# Patient Record
Sex: MALE | Race: WHITE | Employment: FULL TIME | URBAN - METROPOLITAN AREA
[De-identification: names, ages, dates, MRNs, and addresses within clinical notes are randomized per-mention and may not be internally consistent; named-entity substitution may affect disease eponyms.]

---

## 2022-06-25 ENCOUNTER — HOSPITAL ENCOUNTER (EMERGENCY)
Age: 47
Discharge: HOME | End: 2022-06-26
Attending: EMERGENCY MEDICINE
Payer: COMMERCIAL

## 2022-06-25 DIAGNOSIS — M67.972 DISORDER OF LEFT ACHILLES TENDON: Primary | ICD-10-CM

## 2022-06-25 PROCEDURE — 29515 APPLICATION SHORT LEG SPLINT: CPT

## 2022-06-25 PROCEDURE — 99284 EMERGENCY DEPT VISIT MOD MDM: CPT

## 2022-06-25 PROCEDURE — 96372 THER/PROPH/DIAG INJ SC/IM: CPT

## 2022-06-26 VITALS
RESPIRATION RATE: 16 BRPM | DIASTOLIC BLOOD PRESSURE: 90 MMHG | BODY MASS INDEX: 29.35 KG/M2 | WEIGHT: 205 LBS | TEMPERATURE: 98 F | OXYGEN SATURATION: 97 % | SYSTOLIC BLOOD PRESSURE: 143 MMHG | HEART RATE: 54 BPM | HEIGHT: 70 IN

## 2022-06-26 PROCEDURE — 6360000002 HC RX W HCPCS: Performed by: EMERGENCY MEDICINE

## 2022-06-26 RX ORDER — OMEPRAZOLE 40 MG/1
40 CAPSULE, DELAYED RELEASE ORAL DAILY
COMMUNITY
Start: 2021-11-04

## 2022-06-26 RX ORDER — FEXOFENADINE HCL 180 MG/1
180 TABLET ORAL DAILY PRN
COMMUNITY

## 2022-06-26 RX ORDER — AMLODIPINE BESYLATE 5 MG/1
5 TABLET ORAL DAILY
COMMUNITY
Start: 2022-04-27

## 2022-06-26 RX ORDER — BISOPROLOL FUMARATE AND HYDROCHLOROTHIAZIDE 5; 6.25 MG/1; MG/1
TABLET ORAL
COMMUNITY
Start: 2021-11-04

## 2022-06-26 RX ORDER — ZOLPIDEM TARTRATE 10 MG/1
10 TABLET ORAL NIGHTLY PRN
COMMUNITY
Start: 2021-11-04

## 2022-06-26 RX ORDER — KETOROLAC TROMETHAMINE 30 MG/ML
30 INJECTION, SOLUTION INTRAMUSCULAR; INTRAVENOUS ONCE
Status: COMPLETED | OUTPATIENT
Start: 2022-06-26 | End: 2022-06-26

## 2022-06-26 RX ORDER — HYDROCODONE BITARTRATE AND ACETAMINOPHEN 5; 325 MG/1; MG/1
1 TABLET ORAL EVERY 8 HOURS PRN
Qty: 6 TABLET | Refills: 0 | Status: SHIPPED | OUTPATIENT
Start: 2022-06-26 | End: 2022-06-29

## 2022-06-26 RX ORDER — DEXTROAMPHETAMINE SACCHARATE, AMPHETAMINE ASPARTATE MONOHYDRATE, DEXTROAMPHETAMINE SULFATE AND AMPHETAMINE SULFATE 2.5; 2.5; 2.5; 2.5 MG/1; MG/1; MG/1; MG/1
CAPSULE, EXTENDED RELEASE ORAL
COMMUNITY
Start: 2022-05-05

## 2022-06-26 RX ADMIN — KETOROLAC TROMETHAMINE 30 MG: 30 INJECTION, SOLUTION INTRAMUSCULAR; INTRAVENOUS at 00:42

## 2022-06-26 ASSESSMENT — ENCOUNTER SYMPTOMS
WHEEZING: 0
SINUS PRESSURE: 0
DIARRHEA: 0
SORE THROAT: 0
NAUSEA: 0
VOMITING: 0
ABDOMINAL PAIN: 0
RHINORRHEA: 0
SHORTNESS OF BREATH: 0
COUGH: 0
CONSTIPATION: 0

## 2022-06-26 ASSESSMENT — PAIN - FUNCTIONAL ASSESSMENT: PAIN_FUNCTIONAL_ASSESSMENT: 0-10

## 2022-06-26 ASSESSMENT — PAIN DESCRIPTION - PAIN TYPE: TYPE: ACUTE PAIN

## 2022-06-26 ASSESSMENT — PAIN DESCRIPTION - LOCATION: LOCATION: LEG

## 2022-06-26 ASSESSMENT — PAIN SCALES - GENERAL: PAINLEVEL_OUTOF10: 8

## 2022-06-26 ASSESSMENT — PAIN DESCRIPTION - ORIENTATION: ORIENTATION: LEFT

## 2022-06-26 NOTE — ED PROVIDER NOTES
Emergency Department Encounter    Patient: Jesusita Corrigan  MRN: [id number]  : 1975  Date of Evaluation: 2022  ED Provider:  [id number] [facility]     Triage Chief Complaint:   Leg Pain    HPI:  Jesusita Corrigan is a 50 y.o. male with past medical history significant for hypertension who presents with pain to his left lower extremity. Patient was playing basketball this evening, when he went to push off of his left leg he felt like he was hit in his calf with either a baseball bat or a baseball, and felt something pop in his ankle. He has been unable to weight-bear, and has had difficulty moving his foot since this incident. He denies head trauma, LOC, neck or back pain. This incident happened just prior to arrival.  Patient has had constant, throbbing, 8 out of 10 pain. Denies F/C, CP, SOB, palpitations, N/V, abdominal pain, dysuria, diarrhea and constipatin. ROS:  Review of Systems   Constitutional: Negative for chills and fever. HENT: Negative for congestion, rhinorrhea, sinus pressure and sore throat. Eyes: Negative for visual disturbance. Respiratory: Negative for cough, shortness of breath and wheezing. Cardiovascular: Negative for chest pain and palpitations. Gastrointestinal: Negative for abdominal pain, constipation, diarrhea, nausea and vomiting. Genitourinary: Negative for dysuria, frequency and urgency. Musculoskeletal: Positive for arthralgias and myalgias. Skin: Negative for rash. Neurological: Negative for dizziness and syncope. Psychiatric/Behavioral: Negative for agitation, behavioral problems and confusion. Past Medical History:   Diagnosis Date    Hypertension      History reviewed. No pertinent surgical history. History reviewed. No pertinent family history.   Social History     Socioeconomic History    Marital status:      Spouse name: Not on file    Number of children: Not on file    Years of education: Not on file    Highest education level: Not on file   Occupational History    Not on file   Tobacco Use    Smoking status: Never Smoker    Smokeless tobacco: Current User     Types: Chew   Vaping Use    Vaping Use: Never used   Substance and Sexual Activity    Alcohol use: Yes    Drug use: Never    Sexual activity: Not on file   Other Topics Concern    Not on file   Social History Narrative    Not on file     Social Determinants of Health     Financial Resource Strain:     Difficulty of Paying Living Expenses: Not on file   Food Insecurity:     Worried About Running Out of Food in the Last Year: Not on file    Francis of Food in the Last Year: Not on file   Transportation Needs:     Lack of Transportation (Medical): Not on file    Lack of Transportation (Non-Medical): Not on file   Physical Activity:     Days of Exercise per Week: Not on file    Minutes of Exercise per Session: Not on file   Stress:     Feeling of Stress : Not on file   Social Connections:     Frequency of Communication with Friends and Family: Not on file    Frequency of Social Gatherings with Friends and Family: Not on file    Attends Anabaptist Services: Not on file    Active Member of [address] [place] [address] or Organizations: Not on file    Attends Club or Organization Meetings: Not on file    Marital Status: Not on file   Intimate Partner Violence:     Fear of Current or Ex-Partner: Not on file    Emotionally Abused: Not on file    Physically Abused: Not on file    Sexually Abused: Not on file   Housing Stability:     Unable to Pay for Housing in the Last Year: Not on file    Number of [place] in the Last Year: Not on file    Unstable Housing in the Last Year: Not on file     No current facility-administered medications for this encounter.      Current Outpatient Medications   Medication Sig Dispense Refill    omeprazole (PRILOSEC) 40 MG delayed release capsule Take 40 mg by mouth      amLODIPine (NORVASC) 5 MG tablet Take 5 mg by mouth      bisoprolol-hydroCHLOROthiazide (ZIAC) 5-6.25 MG per tablet TAKE 1 TABLET DAILY FOR BLOOD PRESSURE      zolpidem (AMBIEN) 10 MG tablet Take 10 mg by mouth nightly as needed.  fexofenadine (ALLEGRA ALLERGY) 180 MG tablet Take 180 mg by mouth daily      amphetamine-dextroamphetamine (ADDERALL XR) 10 MG extended release capsule TAKE 1 CAPSULE (10 MG TOTAL) BY MOUTH 2 TIMES A DAY. No Known Allergies    Nursing Notes Reviewed    Physical Exam:  Triage VS:    ED Triage Vitals [06/26/22 0010]   Enc Vitals Group      BP (!) 143/90      Heart Rate 54      Resp 16      Temp 98 °F (36.7 °C)      Temp Source Oral      SpO2 97 %      Weight 205 lb (93 kg)      Height 5' 10\" (1.778 m)      Head Circumference       Peak Flow       Pain Score       Pain Loc       Pain Edu? Excl. in [address]? Physical Exam  Vitals and nursing note reviewed. Constitutional:       Appearance: Normal appearance. HENT:      Head: Normocephalic and atraumatic. Nose: Nose normal.      Mouth/Throat:      Mouth: Mucous membranes are moist.   Eyes:      Conjunctiva/sclera: Conjunctivae normal.   Cardiovascular:      Rate and Rhythm: Normal rate and regular rhythm. Pulses: Normal pulses. Pulmonary:      Effort: Pulmonary effort is normal. No respiratory distress. Breath sounds: Normal breath sounds. No wheezing, rhonchi or rales. Abdominal:      General: Abdomen is flat. Bowel sounds are normal. There is no distension. Palpations: Abdomen is soft. Tenderness: There is no abdominal tenderness. There is no guarding or rebound. Musculoskeletal:      Comments: Left calf:  DP, PT pulses and distal capillary refill intact. Patient is able to dorsiflex his foot without difficulty, however with plantar flexion, he has weak plantar flexion, approximately 2 out of 5. There is mild edema around the Achilles tendon, and the Achilles tendon is tender to palpation, and diminished in strength.   Positive Valencia test. Sensation intact throughout the left lower extremity. Compartments are soft. Skin:     General: Skin is warm. Capillary Refill: Capillary refill takes less than 2 seconds. Neurological:      Mental Status: He is alert and oriented to person, place, and time. Mental status is at baseline. Psychiatric:         Mood and Affect: Mood normal.         Chart review shows recent radiographs:  No results found. MDM:  Patient seen and examined. Patient's physical exam is consistent with an Achilles tendon tear or rupture. He is neurovascular intact, with soft compartments. He is placed in a short leg splint, please see procedure note for further documentation. Patient did tolerate this procedure well. He did bring crutches from home to use, and he does wish to continue using his crutches. I did discuss case with orthopedic surgery on-call. Patient to follow-up with orthopedic surgery on-call on Monday, 6/27/2022. Patient agrees to do this. Return precautions are discussed, patient does verbalize understanding of these. All questions are answered and he is in agreement with plan of care. 12:36 AM  Case discussed with orthopedic surgery on-call, Dr. Brigido Goldberg, who will see patient on outpatient basis, patient to call the office on Monday. 3:38 AM  Patient remains neurovascularly intact, DP, PT pulses intact and compartments are soft. Splint Application    Date/Time: 6/26/2022 3:41 AM  Performed by: China Moseley DO  Authorized by: China Moseley DO     Consent:     Consent obtained:  Verbal    Consent given by:  Patient  Procedure details:     Laterality:  Left    Location:  Leg    Leg:  L lower leg    Splint type:  Short leg    Supplies:  Ortho-Glass  Post-procedure details:     Pain:  Improved    Sensation:  Normal    Patient tolerance of procedure: Tolerated well, no immediate complications                Clinical Impression:  1.  Disorder of left Achilles tendon Disposition referral (if applicable):  Caren Munguia DO  [address]  [phone]    Schedule an appointment as soon as possible for a visit in 2 days      [place] [address] [address]  [phone]  Go to   As needed, If symptoms worsen    Disposition medications (if applicable):  New Prescriptions    HYDROCODONE-ACETAMINOPHEN (NORCO) 5-325 MG PER TABLET    Take 1 tablet by mouth every 8 hours as needed for Pain for up to 3 days. Intended supply: 3 days. Take lowest dose possible to manage pain       Comment: Please note this report has been produced using speech recognition software and may contain errors related to that system including errors in grammar, punctuation, and spelling, as well as words and phrases that may be inappropriate. Efforts were made to edit the dictations.        [id number] [facility],   06/26/22 4566

## 2022-06-26 NOTE — ED TRIAGE NOTES
approx 2000, Playing basketball, felt and heard pop in left calf. Unable to bear weight and reports episodes of numbness.

## 2022-06-27 ENCOUNTER — OFFICE VISIT (OUTPATIENT)
Dept: ORTHOPEDIC SURGERY | Age: 47
End: 2022-06-27
Payer: COMMERCIAL

## 2022-06-27 VITALS
BODY MASS INDEX: 29.41 KG/M2 | RESPIRATION RATE: 16 BRPM | HEIGHT: 70 IN | DIASTOLIC BLOOD PRESSURE: 72 MMHG | HEART RATE: 44 BPM | OXYGEN SATURATION: 98 % | SYSTOLIC BLOOD PRESSURE: 122 MMHG

## 2022-06-27 DIAGNOSIS — S86.012A STRAIN OF LEFT ACHILLES TENDON, INITIAL ENCOUNTER: Primary | ICD-10-CM

## 2022-06-27 DIAGNOSIS — S86.012A ACHILLES RUPTURE, LEFT, INITIAL ENCOUNTER: ICD-10-CM

## 2022-06-27 PROCEDURE — 99203 OFFICE O/P NEW LOW 30 MIN: CPT | Performed by: ORTHOPAEDIC SURGERY

## 2022-06-27 RX ORDER — SILDENAFIL 100 MG/1
100 TABLET, FILM COATED ORAL PRN
COMMUNITY
Start: 2021-11-04 | End: 2022-11-04

## 2022-06-27 RX ORDER — ASPIRIN 81 MG/1
81 TABLET ORAL DAILY
COMMUNITY

## 2022-06-27 NOTE — PATIENT INSTRUCTIONS
We will schedule surgery at soonest convenience.  If you have any questions regarding your surgery please call our office and ask to speak with [facility] [phone]

## 2022-06-27 NOTE — PROGRESS NOTES
Patient is in the office with a left achilles tendon rupture. Patient states that he was playing basketball and he stepped back and felt a weird pain within the achilles like he was hit with a baseball bat.

## 2022-06-27 NOTE — PROGRESS NOTES
 Frequency of Social Gatherings with Friends and Family: Not on file    Attends Scientologist Services: Not on file    Active Member of Clubs or Organizations: Not on file    Attends Club or Organization Meetings: Not on file    Marital Status: Not on file   Intimate Partner Violence:     Fear of Current or Ex-Partner: Not on file    Emotionally Abused: Not on file    Physically Abused: Not on file    Sexually Abused: Not on file   Housing Stability:     Unable to Pay for Housing in the Last Year: Not on file    Number of [place] in the Last Year: Not on file    Unstable Housing in the Last Year: Not on file       Current Outpatient Medications   Medication Sig Dispense Refill    sildenafil (VIAGRA) 100 MG tablet Take 100 mg by mouth as needed      aspirin 81 MG EC tablet Take 81 mg by mouth daily      omeprazole (PRILOSEC) 40 MG delayed release capsule Take 40 mg by mouth      amLODIPine (NORVASC) 5 MG tablet Take 5 mg by mouth      bisoprolol-hydroCHLOROthiazide (ZIAC) 5-6.25 MG per tablet TAKE 1 TABLET DAILY FOR BLOOD PRESSURE      amphetamine-dextroamphetamine (ADDERALL XR) 10 MG extended release capsule TAKE 1 CAPSULE (10 MG TOTAL) BY MOUTH 2 TIMES A DAY.  zolpidem (AMBIEN) 10 MG tablet Take 10 mg by mouth nightly as needed.  fexofenadine (ALLEGRA ALLERGY) 180 MG tablet Take 180 mg by mouth daily      HYDROcodone-acetaminophen (NORCO) 5-325 MG per tablet Take 1 tablet by mouth every 8 hours as needed for Pain for up to 3 days. Intended supply: 3 days. Take lowest dose possible to manage pain 6 tablet 0     No current facility-administered medications for this visit. No Known Allergies    Review of Systems:    Review of Systems   Musculoskeletal: Positive for arthralgias and myalgias. Skin: Negative for rash and wound. Physical Exam:   Resp 16   Ht 5' 10\" (1.778 m)   BMI 29.41 kg/m²       Patient is on crutches and is not bearing weight on the left ankle. Gen/Psych:Examination reveals a pleasant individual in no acute distress. The patient is oriented to time, place and person. The patient's mood and affect are appropriate. Patient appears well nourished. Body habitus is mildly overweight    HEENT: Head is atraumatic normocephalic,  ears are symmetric, eyes show equal pupils bilaterally, extraocular muscles intact. Hearing is intact to normal voice of 5 feet. Lymph:  No lymphedema in bilateral lower extremities     Skin: intact in bilateral lower extremities with no ulcerations, lesions, rash, erythema. Vascular: There are no varicosities in bilateral lower extremities, sensation intact to light touch over bilateral lower extremities. Musculoskeletal:     left ankle/foot exam:  Inspection: Ecchymosis present  Palpation:Palpable defect within the distal distal third of the tendon  Range of motion/Strength   Dorsiflexion 5 degrees       [place] test: Achilles tendon not intact        Outside record review:   ER note reviewed        Impression:     Diagnosis Orders   1.  Strain of left Achilles tendon, initial encounter  XR ANKLE LEFT (MIN 3 VIEWS)   2. Achilles rupture, left, initial encounter             Plan:    Patient will be switched to Dr. Parrish Night schedule as this appears to be surgical

## 2022-06-28 ENCOUNTER — TELEPHONE (OUTPATIENT)
Dept: ORTHOPEDIC SURGERY | Age: 47
End: 2022-06-28

## 2022-06-28 ASSESSMENT — ENCOUNTER SYMPTOMS
CHEST TIGHTNESS: 0
BACK PAIN: 0
ABDOMINAL PAIN: 0
COLOR CHANGE: 0
EYE REDNESS: 0

## 2022-06-28 NOTE — TELEPHONE ENCOUNTER
Scheduled patient in office for LT [address] on 6/30/22 at [facility]. Patient voiced understanding of date/time and instructions. Procedure request faxed. PCP Clearance request sent. Insurance ([employer]) contacted on 6/28/22 via phone call to [phone], spoke with Krzysztof Pierre , she states that CPT 19759 does not require authorization.     Ref#: [email or web address]

## 2022-06-28 NOTE — PROGRESS NOTES
Subjective:      Patient ID: Angelina Shields is a 50 y.o. male. Patient is in the office with a left achilles tendon rupture. Patient states that he was playing basketball and he stepped back and felt a weird pain within the achilles like he was hit with a baseball bat. He comes in today for his first visit with me in regards to his left ankle injury. He states that since the injury he has continued having a dull, aching pain as well as cramping pain along the posterior aspect of the left ankle. He states that he has remained nonweightbearing on left lower extremity and has worn his splint and is continued using the crutches. Patient denies any prior injury to the involved extremity/ joint, denies numbness or tingling in the involved extremity and denies fever or chills. Review of Systems   Constitutional: Negative for activity change, chills and fever. HENT: Negative for congestion and drooling. Eyes: Negative for redness. Respiratory: Negative for chest tightness. Cardiovascular: Negative for chest pain. Gastrointestinal: Negative for abdominal pain. Endocrine: Negative for cold intolerance and heat intolerance. Musculoskeletal: Positive for arthralgias, gait problem, joint swelling and myalgias. Negative for back pain. Skin: Negative for color change, pallor, rash and wound. Neurological: Positive for weakness. Negative for numbness. Psychiatric/Behavioral: Negative for confusion. Past Medical History:   Diagnosis Date    Hypertension        Objective:   Physical Exam  Constitutional:       Appearance: He is well-developed. HENT:      Head: Normocephalic and atraumatic. Eyes:      Pupils: Pupils are equal, round, and reactive to light. Pulmonary:      Effort: Pulmonary effort is normal.   Musculoskeletal:         General: Swelling, tenderness, deformity and signs of injury present. Cervical back: Normal range of motion.       Right knee: Normal.      Left knee: Normal.      Right ankle: No swelling, deformity, ecchymosis or lacerations. No tenderness. No lateral malleolus, medial malleolus, AITF ligament or proximal fibula tenderness. Normal range of motion. Normal pulse. Right Achilles Tendon: Normal.      Left ankle: Swelling, deformity and ecchymosis present. No lacerations. Tenderness present. No lateral malleolus, medial malleolus, AITF ligament, CF ligament or proximal fibula tenderness. Decreased range of motion. Normal pulse. Left Achilles Tendon: Tenderness and defect present. Valencia's test positive. Skin:     General: Skin is warm and dry. Capillary Refill: Capillary refill takes less than 2 seconds. Coloration: Skin is not pale. Findings: Bruising present. No erythema or rash. Neurological:      Mental Status: He is alert and oriented to person, place, and time. Left ankle-skin intact, moderate swelling, moderate ecchymosis. Obvious deformity at the Achilles tendon. XRAY  X-ray 3 views of the left ankle from June 27, 2022 reviewed by me today in the office demonstrates age appropriate bone density throughout with disruption of the posterior fat pad triangle consistent with Achilles tendon rupture, no acute osseous abnormalities. Assessment:      Left Achilles tendon rupture      Plan:      I discussed with him today his x-ray findings as well as his clinical findings. I explained to him that he did indeed rupture his left Achilles tendon which will require surgical treatment. I discussed with him today performing repair of his left Achilles tendon with tissue graft. I explained risks, benefits, possible complications of the procedure and answered all questions for the patient. I explained postoperative rehabilitation protocol and expectations with the patient today. The patient understands and consents to the procedure.     Patient will follow up with their primary care physician prior to surgical treatment for preoperative clearance. We will schedule surgery at soonest convenience. I placed him in a walking boot to use as needed for comfort. He can begin weightbearing on the left foot as tolerated. Continue weight-bearing as tolerated. Continue range of motion exercises as instructed. Ice and elevate as needed. Tylenol or Motrin for pain. Follow up in 2 weeks postop and we will plan on proceeding with surgical treatment on Thursday in [place].             Paula 97, DO

## 2022-06-28 NOTE — PROGRESS NOTES
Surgery Date:  6/30/22                                  Arrive at: 1100  Surgery time: 6227     If your arrival time is before 7 AM come in the emergency room entrance. If after 7 AM come in the main entrance. Two visitors may accompany you to the hospital.  Everyone must wear a mask and be free of covid symptoms. 1. Do not eat or drink anything after midnight - unless instructed by your doctor prior to surgery. This includes                  no water, chewing gum or mints. 2. Follow your directions as prescribed by the doctor for your procedure and medications. Take the following medications with a small sip of water the morning of: Norvasc, Prilosec              3. Check with your Doctor regarding stopping Plavix, Coumadin, Lovenox, Effient, Pradaxa, Xarelto, Fragmin or other blood thinners and                  follow their instructions. 4. Do not smoke and do not drink any alcoholic beverages 24 hours prior to surgery. 5. You may brush your teeth and gargle the morning of surgery. DO NOT SWALLOW WATER  6. Please wear simple, loose fitting clothing to the hospital.  Flori Karissa not bring valuables (money, credit cards, checkbooks, etc.) Do not wear any                  makeup (including no eye makeup) or nail polish on your fingers or toes. 7.  DO NOT wear any jewelry or piercings on day of surgery. All body piercing jewelry must be removed. 8.  Take a shower the night before or morning of your procedure, do not apply any lotion, oil or powder. 9. If you have dentures, they will be removed before going to the OR; we will provide you a container. If you wear contact lenses or glasses,                 they will be removed; please bring a case for them. 10. If you  have a Living Will and Durable Power of  for Healthcare, please bring in a copy.            11. Please bring picture ID,  insurance card, paperwork from

## 2022-06-30 ENCOUNTER — ANESTHESIA (OUTPATIENT)
Dept: OPERATING ROOM | Age: 47
End: 2022-06-30
Payer: COMMERCIAL

## 2022-06-30 ENCOUNTER — HOSPITAL ENCOUNTER (OUTPATIENT)
Age: 47
Setting detail: OUTPATIENT SURGERY
Discharge: HOME | End: 2022-06-30
Attending: ORTHOPAEDIC SURGERY | Admitting: ORTHOPAEDIC SURGERY
Payer: COMMERCIAL

## 2022-06-30 ENCOUNTER — ANESTHESIA EVENT (OUTPATIENT)
Dept: OPERATING ROOM | Age: 47
End: 2022-06-30
Payer: COMMERCIAL

## 2022-06-30 VITALS
HEIGHT: 70 IN | DIASTOLIC BLOOD PRESSURE: 89 MMHG | HEART RATE: 44 BPM | WEIGHT: 205 LBS | RESPIRATION RATE: 16 BRPM | BODY MASS INDEX: 29.35 KG/M2 | OXYGEN SATURATION: 100 % | SYSTOLIC BLOOD PRESSURE: 133 MMHG | TEMPERATURE: 96.8 F

## 2022-06-30 DIAGNOSIS — Z98.890 S/P ACHILLES TENDON REPAIR: Primary | ICD-10-CM

## 2022-06-30 PROCEDURE — 7100000010 HC PHASE II RECOVERY - FIRST 15 MIN: Performed by: ORTHOPAEDIC SURGERY

## 2022-06-30 PROCEDURE — 3700000001 HC ADD 15 MINUTES (ANESTHESIA): Performed by: ORTHOPAEDIC SURGERY

## 2022-06-30 PROCEDURE — 2580000003 HC RX 258: Performed by: ORTHOPAEDIC SURGERY

## 2022-06-30 PROCEDURE — 2709999900 HC NON-CHARGEABLE SUPPLY: Performed by: ORTHOPAEDIC SURGERY

## 2022-06-30 PROCEDURE — 7100000000 HC PACU RECOVERY - FIRST 15 MIN: Performed by: ORTHOPAEDIC SURGERY

## 2022-06-30 PROCEDURE — 7100000001 HC PACU RECOVERY - ADDTL 15 MIN: Performed by: ORTHOPAEDIC SURGERY

## 2022-06-30 PROCEDURE — 6360000002 HC RX W HCPCS: Performed by: ORTHOPAEDIC SURGERY

## 2022-06-30 PROCEDURE — 3700000000 HC ANESTHESIA ATTENDED CARE: Performed by: ORTHOPAEDIC SURGERY

## 2022-06-30 PROCEDURE — 27650 REPAIR ACHILLES TENDON: CPT | Performed by: ORTHOPAEDIC SURGERY

## 2022-06-30 PROCEDURE — 3600000013 HC SURGERY LEVEL 3 ADDTL 15MIN: Performed by: ORTHOPAEDIC SURGERY

## 2022-06-30 PROCEDURE — 6370000000 HC RX 637 (ALT 250 FOR IP): Performed by: ORTHOPAEDIC SURGERY

## 2022-06-30 PROCEDURE — 2500000003 HC RX 250 WO HCPCS: Performed by: NURSE ANESTHETIST, CERTIFIED REGISTERED

## 2022-06-30 PROCEDURE — 3600000003 HC SURGERY LEVEL 3 BASE: Performed by: ORTHOPAEDIC SURGERY

## 2022-06-30 PROCEDURE — C1889 IMPLANT/INSERT DEVICE, NOC: HCPCS | Performed by: ORTHOPAEDIC SURGERY

## 2022-06-30 PROCEDURE — 7100000011 HC PHASE II RECOVERY - ADDTL 15 MIN: Performed by: ORTHOPAEDIC SURGERY

## 2022-06-30 PROCEDURE — 6360000002 HC RX W HCPCS: Performed by: NURSE ANESTHETIST, CERTIFIED REGISTERED

## 2022-06-30 PROCEDURE — 2500000003 HC RX 250 WO HCPCS: Performed by: ORTHOPAEDIC SURGERY

## 2022-06-30 DEVICE — IMPLANTABLE DEVICE: Type: IMPLANTABLE DEVICE | Site: ANKLE | Status: FUNCTIONAL

## 2022-06-30 RX ORDER — OXYCODONE HYDROCHLORIDE 10 MG/1
10 TABLET ORAL EVERY 4 HOURS PRN
Status: DISCONTINUED | OUTPATIENT
Start: 2022-06-30 | End: 2022-06-30 | Stop reason: HOSPADM

## 2022-06-30 RX ORDER — SODIUM CHLORIDE 9 MG/ML
INJECTION, SOLUTION INTRAVENOUS PRN
Status: DISCONTINUED | OUTPATIENT
Start: 2022-06-30 | End: 2022-06-30 | Stop reason: HOSPADM

## 2022-06-30 RX ORDER — CEFAZOLIN SODIUM 2 G/100ML
2000 INJECTION, SOLUTION INTRAVENOUS
Status: COMPLETED | OUTPATIENT
Start: 2022-06-30 | End: 2022-06-30

## 2022-06-30 RX ORDER — SODIUM CHLORIDE 0.9 % (FLUSH) 0.9 %
5-40 SYRINGE (ML) INJECTION PRN
Status: DISCONTINUED | OUTPATIENT
Start: 2022-06-30 | End: 2022-06-30 | Stop reason: HOSPADM

## 2022-06-30 RX ORDER — SODIUM CHLORIDE, SODIUM LACTATE, POTASSIUM CHLORIDE, CALCIUM CHLORIDE 600; 310; 30; 20 MG/100ML; MG/100ML; MG/100ML; MG/100ML
INJECTION, SOLUTION INTRAVENOUS CONTINUOUS
Status: DISCONTINUED | OUTPATIENT
Start: 2022-06-30 | End: 2022-06-30 | Stop reason: HOSPADM

## 2022-06-30 RX ORDER — DEXAMETHASONE SODIUM PHOSPHATE 4 MG/ML
INJECTION, SOLUTION INTRA-ARTICULAR; INTRALESIONAL; INTRAMUSCULAR; INTRAVENOUS; SOFT TISSUE PRN
Status: DISCONTINUED | OUTPATIENT
Start: 2022-06-30 | End: 2022-06-30 | Stop reason: SDUPTHER

## 2022-06-30 RX ORDER — KETOROLAC TROMETHAMINE 30 MG/ML
30 INJECTION, SOLUTION INTRAMUSCULAR; INTRAVENOUS EVERY 6 HOURS
Status: DISCONTINUED | OUTPATIENT
Start: 2022-06-30 | End: 2022-06-30 | Stop reason: HOSPADM

## 2022-06-30 RX ORDER — SODIUM CHLORIDE 0.9 % (FLUSH) 0.9 %
5-40 SYRINGE (ML) INJECTION EVERY 12 HOURS SCHEDULED
Status: DISCONTINUED | OUTPATIENT
Start: 2022-06-30 | End: 2022-06-30 | Stop reason: HOSPADM

## 2022-06-30 RX ORDER — MIDAZOLAM HYDROCHLORIDE 1 MG/ML
INJECTION INTRAMUSCULAR; INTRAVENOUS PRN
Status: DISCONTINUED | OUTPATIENT
Start: 2022-06-30 | End: 2022-06-30 | Stop reason: SDUPTHER

## 2022-06-30 RX ORDER — OXYCODONE HYDROCHLORIDE 5 MG/1
5 TABLET ORAL EVERY 4 HOURS PRN
Status: DISCONTINUED | OUTPATIENT
Start: 2022-06-30 | End: 2022-06-30 | Stop reason: HOSPADM

## 2022-06-30 RX ORDER — FENTANYL CITRATE 50 UG/ML
INJECTION, SOLUTION INTRAMUSCULAR; INTRAVENOUS PRN
Status: DISCONTINUED | OUTPATIENT
Start: 2022-06-30 | End: 2022-06-30 | Stop reason: SDUPTHER

## 2022-06-30 RX ORDER — SODIUM CHLORIDE, SODIUM LACTATE, POTASSIUM CHLORIDE, CALCIUM CHLORIDE 600; 310; 30; 20 MG/100ML; MG/100ML; MG/100ML; MG/100ML
1000 INJECTION, SOLUTION INTRAVENOUS CONTINUOUS
Status: DISCONTINUED | OUTPATIENT
Start: 2022-06-30 | End: 2022-06-30 | Stop reason: HOSPADM

## 2022-06-30 RX ORDER — PROPOFOL 10 MG/ML
INJECTION, EMULSION INTRAVENOUS PRN
Status: DISCONTINUED | OUTPATIENT
Start: 2022-06-30 | End: 2022-06-30 | Stop reason: SDUPTHER

## 2022-06-30 RX ORDER — LIDOCAINE HYDROCHLORIDE 20 MG/ML
INJECTION, SOLUTION EPIDURAL; INFILTRATION; INTRACAUDAL; PERINEURAL PRN
Status: DISCONTINUED | OUTPATIENT
Start: 2022-06-30 | End: 2022-06-30 | Stop reason: SDUPTHER

## 2022-06-30 RX ORDER — ONDANSETRON 4 MG/1
4 TABLET, ORALLY DISINTEGRATING ORAL EVERY 8 HOURS PRN
Status: DISCONTINUED | OUTPATIENT
Start: 2022-06-30 | End: 2022-06-30 | Stop reason: HOSPADM

## 2022-06-30 RX ORDER — CEPHALEXIN 250 MG/1
250 CAPSULE ORAL 4 TIMES DAILY
Qty: 4 CAPSULE | Refills: 0 | Status: SHIPPED | OUTPATIENT
Start: 2022-06-30 | End: 2022-07-01

## 2022-06-30 RX ORDER — ACETAMINOPHEN 500 MG
1000 TABLET ORAL ONCE
Status: COMPLETED | OUTPATIENT
Start: 2022-06-30 | End: 2022-06-30

## 2022-06-30 RX ORDER — BUPIVACAINE HYDROCHLORIDE 5 MG/ML
INJECTION, SOLUTION PERINEURAL
Status: COMPLETED | OUTPATIENT
Start: 2022-06-30 | End: 2022-06-30

## 2022-06-30 RX ORDER — ROCURONIUM BROMIDE 10 MG/ML
INJECTION, SOLUTION INTRAVENOUS PRN
Status: DISCONTINUED | OUTPATIENT
Start: 2022-06-30 | End: 2022-06-30 | Stop reason: SDUPTHER

## 2022-06-30 RX ORDER — KETOROLAC TROMETHAMINE 30 MG/ML
INJECTION, SOLUTION INTRAMUSCULAR; INTRAVENOUS PRN
Status: DISCONTINUED | OUTPATIENT
Start: 2022-06-30 | End: 2022-06-30 | Stop reason: SDUPTHER

## 2022-06-30 RX ORDER — ONDANSETRON 2 MG/ML
4 INJECTION INTRAMUSCULAR; INTRAVENOUS EVERY 6 HOURS PRN
Status: DISCONTINUED | OUTPATIENT
Start: 2022-06-30 | End: 2022-06-30 | Stop reason: HOSPADM

## 2022-06-30 RX ORDER — OXYCODONE HYDROCHLORIDE AND ACETAMINOPHEN 5; 325 MG/1; MG/1
1 TABLET ORAL EVERY 6 HOURS PRN
Qty: 15 TABLET | Refills: 0 | Status: SHIPPED | OUTPATIENT
Start: 2022-06-30 | End: 2022-07-07

## 2022-06-30 RX ORDER — ONDANSETRON 2 MG/ML
INJECTION INTRAMUSCULAR; INTRAVENOUS PRN
Status: DISCONTINUED | OUTPATIENT
Start: 2022-06-30 | End: 2022-06-30 | Stop reason: SDUPTHER

## 2022-06-30 RX ADMIN — PROPOFOL 150 MG: 10 INJECTION, EMULSION INTRAVENOUS at 13:54

## 2022-06-30 RX ADMIN — LIDOCAINE HYDROCHLORIDE 50 MG: 20 INJECTION, SOLUTION EPIDURAL; INFILTRATION; INTRACAUDAL; PERINEURAL at 13:54

## 2022-06-30 RX ADMIN — MIDAZOLAM 2 MG: 1 INJECTION INTRAMUSCULAR; INTRAVENOUS at 13:47

## 2022-06-30 RX ADMIN — DEXAMETHASONE SODIUM PHOSPHATE 8 MG: 4 INJECTION, SOLUTION INTRAMUSCULAR; INTRAVENOUS at 13:58

## 2022-06-30 RX ADMIN — SUGAMMADEX 50 MG: 100 INJECTION, SOLUTION INTRAVENOUS at 14:58

## 2022-06-30 RX ADMIN — SUGAMMADEX 50 MG: 100 INJECTION, SOLUTION INTRAVENOUS at 14:50

## 2022-06-30 RX ADMIN — FENTANYL CITRATE 100 MCG: 50 INJECTION, SOLUTION INTRAMUSCULAR; INTRAVENOUS at 13:52

## 2022-06-30 RX ADMIN — KETOROLAC TROMETHAMINE 30 MG: 30 INJECTION, SOLUTION INTRAMUSCULAR; INTRAVENOUS at 14:41

## 2022-06-30 RX ADMIN — CEFAZOLIN SODIUM 2000 MG: 2 INJECTION, SOLUTION INTRAVENOUS at 13:51

## 2022-06-30 RX ADMIN — SODIUM CHLORIDE, POTASSIUM CHLORIDE, SODIUM LACTATE AND CALCIUM CHLORIDE 1000 ML: 600; 310; 30; 20 INJECTION, SOLUTION INTRAVENOUS at 11:15

## 2022-06-30 RX ADMIN — ONDANSETRON 4 MG: 2 INJECTION INTRAMUSCULAR; INTRAVENOUS at 13:58

## 2022-06-30 RX ADMIN — ACETAMINOPHEN 1000 MG: 500 TABLET ORAL at 11:17

## 2022-06-30 RX ADMIN — OXYCODONE HYDROCHLORIDE 10 MG: 10 TABLET ORAL at 15:52

## 2022-06-30 RX ADMIN — ROCURONIUM BROMIDE 50 MG: 10 INJECTION INTRAVENOUS at 13:55

## 2022-06-30 RX ADMIN — SUGAMMADEX 50 MG: 100 INJECTION, SOLUTION INTRAVENOUS at 15:00

## 2022-06-30 RX ADMIN — PROPOFOL 30 MG: 10 INJECTION, EMULSION INTRAVENOUS at 13:56

## 2022-06-30 RX ADMIN — SUGAMMADEX 50 MG: 100 INJECTION, SOLUTION INTRAVENOUS at 14:55

## 2022-06-30 ASSESSMENT — PAIN SCALES - GENERAL
PAINLEVEL_OUTOF10: 4
PAINLEVEL_OUTOF10: 5

## 2022-06-30 ASSESSMENT — PAIN DESCRIPTION - DESCRIPTORS: DESCRIPTORS: DISCOMFORT;PRESSURE

## 2022-06-30 ASSESSMENT — PAIN DESCRIPTION - LOCATION
LOCATION: LEG
LOCATION: LEG

## 2022-06-30 ASSESSMENT — PAIN - FUNCTIONAL ASSESSMENT: PAIN_FUNCTIONAL_ASSESSMENT: 0-10

## 2022-06-30 ASSESSMENT — PAIN DESCRIPTION - ORIENTATION
ORIENTATION: LEFT
ORIENTATION: LEFT

## 2022-06-30 NOTE — BRIEF OP NOTE
Brief Postoperative Note      Patient: Lindi Goltz  YOB: 1975  MRN: [id number]    Date of Procedure: 6/30/2022    Pre-Op Diagnosis: Unspecified injury of left Achilles tendon, initial encounter [S86.002A]    Post-Op Diagnosis: Same       Procedure(s):  LEFT ACHILLES TENDON LENGTHENING REPAIR    Surgeon(s):  Erik Fernandes DO    Assistant:  * No surgical staff found *    Anesthesia: General    Estimated Blood Loss (mL): Minimal    Complications: None    Specimens:   * No specimens in log *    Implants:  Implant Name Type Inv.  Item Serial No.  Lot No. LRB No. Used Action   GRAFT HUM TISS M [id number] ACELLULAR DERM ALLGRFT [facility] - [id number]  GRAFT HUM TISS M [id number] ACELLULAR DERM ALLGRFT [facility] [employer] [facility]- [id number] Left 1 Implanted         Drains: * No LDAs found *    Findings: Left Achilles tendon rupture    Electronically signed by Jailene Diaz DO on 6/30/2022 at 3:07 PM

## 2022-06-30 NOTE — PROGRESS NOTES
Pt. Awake, alert, and oriented. On room air, VS stable. Pt. Denies nausea. Does c/o some mild, tolerable pain, unchanged from earlier. Ice pack in place. Dressing to LLE is dry/intact. No drainage noted. IV infusing without difficulty. RLE SCD on. Pt. Ready for d/c from PACU.

## 2022-06-30 NOTE — ANESTHESIA PRE PROCEDURE
Department of Anesthesiology  Preprocedure Note       Name:  Brayan Rojas   Age:  50 y.o.  :  1975                                          MRN:  [id number]         Date:  2022      Surgeon: Larry Evans):  Sean Garcia DO    Procedure: Procedure(s):  LEFT ACHILLES TENDON LENGTHENING REPAIR    Medications prior to admission:   Prior to Admission medications    Medication Sig Start Date End Date Taking? Authorizing Provider   sildenafil (VIAGRA) 100 MG tablet Take 100 mg by mouth as needed 21  Historical Provider, MD   aspirin 81 MG EC tablet Take 81 mg by mouth daily    Historical Provider, MD   omeprazole (PRILOSEC) 40 MG delayed release capsule Take 40 mg by mouth daily  21   Historical Provider, MD   amLODIPine (NORVASC) 5 MG tablet Take 5 mg by mouth daily  22   Historical Provider, MD   bisoprolol-hydroCHLOROthiazide (Tarri Edman) 5-6.25 MG per tablet TAKE 1 TABLET DAILY FOR BLOOD PRESSURE 21   Historical Provider, MD   amphetamine-dextroamphetamine (ADDERALL XR) 10 MG extended release capsule TAKE 1 CAPSULE (10 MG TOTAL) BY MOUTH 2 TIMES A DAY. 22   Historical Provider, MD   zolpidem (AMBIEN) 10 MG tablet Take 10 mg by mouth nightly as needed. 21   Historical Provider, MD   fexofenadine (ALLEGRA ALLERGY) 180 MG tablet Take 180 mg by mouth daily as needed     Historical Provider, MD       Current medications:    No current facility-administered medications for this encounter.      Current Outpatient Medications   Medication Sig Dispense Refill    sildenafil (VIAGRA) 100 MG tablet Take 100 mg by mouth as needed      aspirin 81 MG EC tablet Take 81 mg by mouth daily      omeprazole (PRILOSEC) 40 MG delayed release capsule Take 40 mg by mouth daily       amLODIPine (NORVASC) 5 MG tablet Take 5 mg by mouth daily       bisoprolol-hydroCHLOROthiazide (ZIAC) 5-6.25 MG per tablet TAKE 1 TABLET DAILY FOR BLOOD PRESSURE      amphetamine-dextroamphetamine (ADDERALL XR) 10 MG extended release capsule TAKE 1 CAPSULE (10 MG TOTAL) BY MOUTH 2 TIMES A DAY.  zolpidem (AMBIEN) 10 MG tablet Take 10 mg by mouth nightly as needed.  fexofenadine (ALLEGRA ALLERGY) 180 MG tablet Take 180 mg by mouth daily as needed          Allergies:  No Known Allergies    Problem List:  There is no problem list on file for this patient. Past Medical History:        Diagnosis Date    Acid reflux     Hypertension     Sleep apnea        Past Surgical History:        Procedure Laterality Date    COLONOSCOPY         Social History:    Social History     Tobacco Use    Smoking status: Never Smoker    Smokeless tobacco: Current User     Types: Chew   Substance Use Topics    Alcohol use: Yes     Comment: 4 times/week                                 Ready to quit: Not Answered  Counseling given: Not Answered      Vital Signs (Current):   Vitals:    06/28/22 1456   Weight: 205 lb (93 kg)   Height: 5' 10\" (1.778 m)                                              BP Readings from Last 3 Encounters:   06/27/22 122/72   06/26/22 (!) 143/90       NPO Status:                                                                                 BMI:   Wt Readings from Last 3 Encounters:   06/28/22 205 lb (93 kg)   06/26/22 205 lb (93 kg)     Body mass index is 29.41 kg/m². CBC: No results found for: WBC, RBC, HGB, HCT, MCV, RDW, PLT    CMP: No results found for: NA, K, CL, CO2, BUN, CREATININE, GFRAA, AGRATIO, LABGLOM, GLUCOSE, GLU, PROT, CALCIUM, BILITOT, ALKPHOS, AST, ALT    POC Tests: No results for input(s): POCGLU, POCNA, POCK, POCCL, POCBUN, POCHEMO, POCHCT in the last 72 hours. Coags: No results found for: PROTIME, INR, APTT    HCG (If Applicable): No results found for: PREGTESTUR, PREGSERUM, HCG, HCGQUANT     ABGs: No results found for: PHART, PO2ART, TME7HYR, WGB4RAV, BEART, G7ZHFBUT     Type & Screen (If Applicable):  No results found for: LABABO, LABRH    Drug/Infectious Status (If Applicable):   No results found for: HIV, HEPCAB    COVID-19 Screening (If Applicable): No results found for: COVID19        Anesthesia Evaluation  Patient summary reviewed  Airway: Mallampati: II  TM distance: <3 FB   Neck ROM: full  Mouth opening: > = 3 FB   Dental:    (+) upper dentures      Pulmonary:   (+) sleep apnea: on noncompliant,            Patient did not smoke on day of surgery. ROS comment: Chews tobacco   Cardiovascular:  Exercise tolerance: good (>4 METS),   (+) hypertension: mild,                   Neuro/Psych:   (+) psychiatric history:depression/anxiety              ROS comment: adhd   etoh  GI/Hepatic/Renal:   (+) GERD: well controlled,           Endo/Other:                     Abdominal:             Vascular: Other Findings:           Anesthesia Plan      general     ASA 2     (Risks benefits of geta discussed pt agrees)  Induction: intravenous. MIPS: Postoperative opioids intended and Prophylactic antiemetics administered. Anesthetic plan and risks discussed with patient. Use of blood products discussed with patient whom. Plan discussed with CRNA.                     Genna Hampton, ROSA - [place]   6/30/2022

## 2022-06-30 NOTE — H&P
Subjective:      Patient ID: Shira Steward is a 50 y.o. male.     Patient is in the office with a left achilles tendon rupture. Patient states that he was playing basketball and he stepped back and felt a weird pain within the achilles like he was hit with a baseball bat.     He comes in today for his first visit with me in regards to his left ankle injury. He states that since the injury he has continued having a dull, aching pain as well as cramping pain along the posterior aspect of the left ankle. He states that he has remained nonweightbearing on left lower extremity and has worn his splint and is continued using the crutches. Patient denies any prior injury to the involved extremity/ joint, denies numbness or tingling in the involved extremity and denies fever or chills.           Review of Systems   Constitutional: Negative for activity change, chills and fever. HENT: Negative for congestion and drooling. Eyes: Negative for redness. Respiratory: Negative for chest tightness. Cardiovascular: Negative for chest pain. Gastrointestinal: Negative for abdominal pain. Endocrine: Negative for cold intolerance and heat intolerance. Musculoskeletal: Positive for arthralgias, gait problem, joint swelling and myalgias. Negative for back pain. Skin: Negative for color change, pallor, rash and wound. Neurological: Positive for weakness. Negative for numbness. Psychiatric/Behavioral: Negative for confusion.         Past Medical History        Past Medical History:   Diagnosis Date    Hypertension              No current facility-administered medications on file prior to encounter.      Current Outpatient Medications on File Prior to Encounter   Medication Sig Dispense Refill    sildenafil (VIAGRA) 100 MG tablet Take 100 mg by mouth as needed      aspirin 81 MG EC tablet Take 81 mg by mouth daily      omeprazole (PRILOSEC) 40 MG delayed release capsule Take 40 mg by mouth daily       amLODIPine (NORVASC) 5 MG tablet Take 5 mg by mouth daily       bisoprolol-hydroCHLOROthiazide (ZIAC) 5-6.25 MG per tablet TAKE 1 TABLET DAILY FOR BLOOD PRESSURE      amphetamine-dextroamphetamine (ADDERALL XR) 10 MG extended release capsule TAKE 1 CAPSULE (10 MG TOTAL) BY MOUTH 2 TIMES A DAY.  zolpidem (AMBIEN) 10 MG tablet Take 10 mg by mouth nightly as needed.  fexofenadine (ALLEGRA ALLERGY) 180 MG tablet Take 180 mg by mouth daily as needed        No Known Allergies  Past Surgical History:   Procedure Laterality Date    COLONOSCOPY       Social History     Tobacco Use    Smoking status: Never Smoker    Smokeless tobacco: Current User     Types: Chew   Vaping Use    Vaping Use: Never used   Substance Use Topics    Alcohol use: Yes     Comment: 4 times/week     Drug use: Never     Family History   Problem Relation Age of Onset    Cancer Father     High Blood Pressure Father        Objective:   Physical Exam  Constitutional:       Appearance: He is well-developed. HENT:      Head: Normocephalic and atraumatic. Eyes:      Pupils: Pupils are equal, round, and reactive to light. Pulmonary:      Effort: Pulmonary effort is normal.   Musculoskeletal:         General: Swelling, tenderness, deformity and signs of injury present. Cervical back: Normal range of motion. Right knee: Normal.      Left knee: Normal.      Right ankle: No swelling, deformity, ecchymosis or lacerations. No tenderness. No lateral malleolus, medial malleolus, AITF ligament or proximal fibula tenderness. Normal range of motion. Normal pulse. Right Achilles Tendon: Normal.      Left ankle: Swelling, deformity and ecchymosis present. No lacerations. Tenderness present. No lateral malleolus, medial malleolus, AITF ligament, CF ligament or proximal fibula tenderness. Decreased range of motion. Normal pulse. Left Achilles Tendon: Tenderness and defect present. Valencia's test positive.    Skin:     General: Skin is warm and dry. Capillary Refill: Capillary refill takes less than 2 seconds. Coloration: Skin is not pale. Findings: Bruising present. No erythema or rash. Neurological:      Mental Status: He is alert and oriented to person, place, and time. Left ankle-skin intact, moderate swelling, moderate ecchymosis. Obvious deformity at the Achilles tendon.     XRAY  X-ray 3 views of the left ankle from June 27, 2022 reviewed by me today in the office demonstrates age appropriate bone density throughout with disruption of the posterior fat pad triangle consistent with Achilles tendon rupture, no acute osseous abnormalities.      BP (!) 142/94   Pulse 63   Temp 98 °F (36.7 °C) (Temporal)   Resp 16   Ht 5' 10\" (1.778 m)   Wt 205 lb (93 kg)   SpO2 98%   BMI 29.41 kg/m²     Assessment:   Left Achilles tendon rupture                Plan:   I discussed with him today his x-ray findings as well as his clinical findings. I explained to him that he did indeed rupture his left Achilles tendon which will require surgical treatment. I discussed with him today performing repair of his left Achilles tendon with tissue graft. I explained risks, benefits, possible complications of the procedure and answered all questions for the patient. I explained postoperative rehabilitation protocol and expectations with the patient today. The patient understands and consents to the procedure. Patient will follow up with their primary care physician prior to surgical treatment for preoperative clearance. We will schedule surgery at soonest convenience. I placed him in a walking boot to use as needed for comfort. He can begin weightbearing on the left foot as tolerated. Continue weight-bearing as tolerated. Continue range of motion exercises as instructed. Ice and elevate as needed. Tylenol or Motrin for pain. Follow up in 2 weeks postop and we will plan on proceeding with surgical treatment on Thursday in [place]

## 2022-06-30 NOTE — PROGRESS NOTES
Pt. Awake, alert, and oriented x 4. On room air, vs stable. Pt. Drinking ice water. He took a pain tablet (see MAR), as ordered. Wife at bedside. Call light in reach. Will continue to monitor.

## 2022-06-30 NOTE — ANESTHESIA POSTPROCEDURE EVALUATION
Department of Anesthesiology  Postprocedure Note    Patient: Kathy Landaverde  MRN: [id number]  [place]: 1975  Date of evaluation: 6/30/2022      Procedure Summary     Date: 06/30/22 Room / Location: [address]    Anesthesia Start: 8104 Anesthesia Stop: 7819    Procedure: LEFT ACHILLES TENDON LENGTHENING REPAIR (Left Ankle) Diagnosis:       Unspecified injury of left Achilles tendon, initial encounter      (Unspecified injury of left Achilles tendon, initial encounter [S86.002A])    Surgeons: Kimber Shaikh DO Responsible Provider: ROSA Nunez CRNA    Anesthesia Type: general ASA Status: 2          Anesthesia Type: No value filed.     Jaimee Phase I: Jaimee Score: 10    Jaimee Phase II:        Anesthesia Post Evaluation    Patient location during evaluation: PACU  Patient participation: complete - patient participated  Level of consciousness: awake and alert  Pain score: 0  Airway patency: patent  Nausea & Vomiting: no nausea and no vomiting  Complications: no  Cardiovascular status: blood pressure returned to baseline  Respiratory status: acceptable and room air  Hydration status: euvolemic  Multimodal analgesia pain management approach

## 2022-07-20 ENCOUNTER — OFFICE VISIT (OUTPATIENT)
Dept: ORTHOPEDIC SURGERY | Age: 47
End: 2022-07-20

## 2022-07-20 VITALS
DIASTOLIC BLOOD PRESSURE: 76 MMHG | RESPIRATION RATE: 16 BRPM | SYSTOLIC BLOOD PRESSURE: 126 MMHG | OXYGEN SATURATION: 98 % | HEART RATE: 51 BPM | HEIGHT: 70 IN | TEMPERATURE: 96.8 F | BODY MASS INDEX: 29.41 KG/M2

## 2022-07-20 DIAGNOSIS — Z98.890 S/P ACHILLES TENDON REPAIR: Primary | ICD-10-CM

## 2022-07-20 PROCEDURE — 99024 POSTOP FOLLOW-UP VISIT: CPT | Performed by: ORTHOPAEDIC SURGERY

## 2022-07-20 ASSESSMENT — ENCOUNTER SYMPTOMS
COLOR CHANGE: 0
ABDOMINAL PAIN: 0
CHEST TIGHTNESS: 0
BACK PAIN: 0
EYE REDNESS: 0

## 2022-07-20 NOTE — PROGRESS NOTES
Date of surgery:   May 30th     History:  Mr. Underwood November is here in follow up regarding his left achilles tendon repair  He is doing rather well. He is having 0/10 pain. He denies chest pain, SOB, calf pain,fever,wound drainage. No other issues.

## 2022-07-20 NOTE — PROGRESS NOTES
Subjective:      Patient ID: Darvin Schwab is a 50 y.o. male. Date of surgery:   May 30th      History:  Mr. Darvin Schwab is here in follow up regarding his left achilles tendon repair  He is doing rather well. He is having 0/10 pain. He denies chest pain, SOB, calf pain,fever,wound drainage. No other issues. He comes in today for his 2-week postop recheck. Overall he states he is feeling much better is not having much pain in the left ankle. He has remained nonweightbearing left lower extremity. Patient denies any new injury to the involved extremity/ joint, denies numbness or tingling in the involved extremity and denies fever or chills. Review of Systems   Constitutional:  Negative for activity change, chills and fever. HENT:  Negative for congestion and drooling. Eyes:  Negative for redness. Respiratory:  Negative for chest tightness. Cardiovascular:  Negative for chest pain. Gastrointestinal:  Negative for abdominal pain. Endocrine: Negative for cold intolerance and heat intolerance. Musculoskeletal:  Positive for arthralgias, gait problem, joint swelling and myalgias. Negative for back pain. Skin:  Negative for color change, pallor, rash and wound. Neurological:  Positive for weakness. Negative for numbness. Psychiatric/Behavioral:  Negative for confusion. Past Medical History:   Diagnosis Date    Acid reflux     Hypertension     Sleep apnea        Objective:   Physical Exam  Constitutional:       Appearance: He is well-developed. HENT:      Head: Normocephalic and atraumatic. Eyes:      Pupils: Pupils are equal, round, and reactive to light. Pulmonary:      Effort: Pulmonary effort is normal.   Musculoskeletal:         General: Swelling and tenderness present. No deformity or signs of injury. Cervical back: Normal range of motion. Right knee: Normal.      Left knee: Normal.      Right ankle: No swelling, deformity, ecchymosis or lacerations.  No tenderness. No lateral malleolus, medial malleolus, AITF ligament or proximal fibula tenderness. Normal range of motion. Normal pulse. Right Achilles Tendon: Normal.      Left ankle: Swelling and ecchymosis present. No deformity or lacerations. Tenderness present. No lateral malleolus, medial malleolus, AITF ligament, CF ligament or proximal fibula tenderness. Decreased range of motion. Normal pulse. Left Achilles Tendon: Normal. No tenderness or defects. Valencia's test negative. Skin:     General: Skin is warm and dry. Capillary Refill: Capillary refill takes less than 2 seconds. Coloration: Skin is not pale. Findings: Bruising present. No erythema or rash. Neurological:      Mental Status: He is alert and oriented to person, place, and time. Left ankle-Incision clean, dry, intact, with no erythema, no drainage, and no signs of infection. Sutures present and removed today. Assessment:      Left Achilles tendon repair, 2 weeks      Plan:      I discussed with him today that he is continuing to progress very well. I did place him into a walking boot with 4 heel wedges. At this point he can begin weightbearing as tolerated on the left foot in the walking boot. In 2 weeks he can remove one of the heel wedges, in 4 weeks he can remove another heel length. He can remove the walking boot for bathing while at rest.  No running, jumping, heavy lifting. Okay to work on gentle passive and active range of motion for the left ankle. Ice and elevate as needed. Tylenol or Motrin as needed. Follow-up in 6 weeks for recheck at which point we will remove the remaining wedge and start him in formal physical therapy.             Paula Figueroa, DO

## 2022-07-20 NOTE — PATIENT INSTRUCTIONS
Weightbear as tolerated in the cam boot  Heel wedge given in the office today  Continue to weight bear as tolerated  Continue range of motion  Ice and elevate as needed  Tylenol or Motrin for pain  Follow up in one month     May remove one heel wedge every 2 weeks. Remove #1 on 09/03/2022  Removed #2 on 09/17/2022  Removed #3 on 09/31/2022 and so on.

## 2022-08-05 ENCOUNTER — HOSPITAL ENCOUNTER (OUTPATIENT)
Age: 47
Setting detail: SPECIMEN
Discharge: HOME | End: 2022-08-05
Payer: COMMERCIAL

## 2022-08-05 ENCOUNTER — OFFICE VISIT (OUTPATIENT)
Dept: ORTHOPEDIC SURGERY | Age: 47
End: 2022-08-05

## 2022-08-05 VITALS
BODY MASS INDEX: 30.42 KG/M2 | SYSTOLIC BLOOD PRESSURE: 145 MMHG | DIASTOLIC BLOOD PRESSURE: 94 MMHG | HEART RATE: 50 BPM | OXYGEN SATURATION: 98 % | WEIGHT: 212 LBS

## 2022-08-05 DIAGNOSIS — Z98.890 STATUS POST ACHILLES TENDON REPAIR: Primary | ICD-10-CM

## 2022-08-05 PROCEDURE — 87070 CULTURE OTHR SPECIMN AEROBIC: CPT

## 2022-08-05 PROCEDURE — 87147 CULTURE TYPE IMMUNOLOGIC: CPT

## 2022-08-05 PROCEDURE — 87081 CULTURE SCREEN ONLY: CPT

## 2022-08-05 PROCEDURE — 87077 CULTURE AEROBIC IDENTIFY: CPT

## 2022-08-05 PROCEDURE — 87186 SC STD MICRODIL/AGAR DIL: CPT

## 2022-08-05 PROCEDURE — 87075 CULTR BACTERIA EXCEPT BLOOD: CPT

## 2022-08-05 PROCEDURE — 99024 POSTOP FOLLOW-UP VISIT: CPT

## 2022-08-05 RX ORDER — SULFAMETHOXAZOLE AND TRIMETHOPRIM 800; 160 MG/1; MG/1
1 TABLET ORAL 2 TIMES DAILY
Qty: 20 TABLET | Refills: 0 | Status: SHIPPED | OUTPATIENT
Start: 2022-08-05 | End: 2022-08-15

## 2022-08-05 NOTE — PATIENT INSTRUCTIONS
Continue weight-bearing as tolerated. Continue range of motion exercises as instructed. Ice and elevate as needed. Tylenol or Motrin for pain.   Bactrim ordered  Follow up on 08/15/2022

## 2022-08-05 NOTE — PROGRESS NOTES
Pain has improved overall, but recently has increased with suspected infection. Patient denies numbness/tingling.

## 2022-08-07 LAB
CULTURE: NORMAL
Lab: NORMAL
SPECIMEN: NORMAL

## 2022-08-10 ENCOUNTER — TELEPHONE (OUTPATIENT)
Dept: ORTHOPEDIC SURGERY | Age: 47
End: 2022-08-10

## 2022-08-10 LAB
CULTURE: ABNORMAL
CULTURE: ABNORMAL
Lab: ABNORMAL
SPECIMEN: ABNORMAL

## 2022-08-10 NOTE — TELEPHONE ENCOUNTER
Patient wife Molly Atkinson called into the office asking about the patient wound culture results. Patient states that the incision looks more infected.  Please advise

## 2022-08-11 ENCOUNTER — OFFICE VISIT (OUTPATIENT)
Dept: ORTHOPEDIC SURGERY | Age: 47
End: 2022-08-11

## 2022-08-11 VITALS
WEIGHT: 198 LBS | OXYGEN SATURATION: 98 % | HEART RATE: 56 BPM | DIASTOLIC BLOOD PRESSURE: 78 MMHG | RESPIRATION RATE: 16 BRPM | SYSTOLIC BLOOD PRESSURE: 122 MMHG | BODY MASS INDEX: 28.35 KG/M2 | HEIGHT: 70 IN | TEMPERATURE: 98.2 F

## 2022-08-11 DIAGNOSIS — Z98.890 STATUS POST ACHILLES TENDON REPAIR: Primary | ICD-10-CM

## 2022-08-11 PROCEDURE — 99024 POSTOP FOLLOW-UP VISIT: CPT | Performed by: PHYSICIAN ASSISTANT

## 2022-08-11 RX ORDER — CLINDAMYCIN HYDROCHLORIDE 300 MG/1
300 CAPSULE ORAL 4 TIMES DAILY
Qty: 28 CAPSULE | Refills: 0 | Status: SHIPPED | OUTPATIENT
Start: 2022-08-11 | End: 2022-08-18

## 2022-08-11 NOTE — PROGRESS NOTES
Date of surgery:   6/30/2022  Surgeon: Dr. Linda Juarez  History:  Mr. Lea He is here in follow up regarding his left Achilles tendon repair. He was seen last week by a fellow PA in this practice and was given Bactrim for what was thought to be a postoperative wound infection. The patient states that he has not noticed much difference in the wound and it still painful red and swollen. He is not having any fevers. Physical:  Vitals:    08/11/22 1617   BP: 122/78   Site: Right Upper Arm   Position: Sitting   Cuff Size: Medium Adult   Pulse: 56   Resp: 16   Temp: 98.2 °F (36.8 °C)   TempSrc: Temporal   SpO2: 98%   Weight: 198 lb (89.8 kg)   Height: 5' 10\" (1.778 m)     Left posterior ankle  Surgical incision over the Achilles tendon is intact with some mild erythema around the incision area with a very superficial area of dehiscence in the proximal third of the incision. No active drainage present. Desquamation of the skin present around the periphery of the incision.     Impression: Status post above, doing well       Plan:   Wash incision with soap and water  Discontinue Bactrim and start taking clindamycin  Follow-up for wound check with Dr. Linda Juarez

## 2022-08-11 NOTE — TELEPHONE ENCOUNTER
Patient wife states that the incision is looking very tender and has a burning sensation within the achilles. Patient has still been using his antibiotic Bactrim and seemed like it did get better for 2 days and then the incision started to drain and look like large amount of pus and drainage coming from the incision. Patient wife is very concerned. Spoke Wilhemjessica Harrison he states that he would feel most comfortable about the patient being seen by Dr. Jarek Bruce. Buster Romero is also in the office today and offered for the patient to be seen in office today for another wound check.

## 2022-08-11 NOTE — PATIENT INSTRUCTIONS
Continue with the cam boot  Continue to weight bear as tolerated  Continue range of motion  Ice and elevate as needed  Tylenol or Motrin for pain  Follow up on Monday with Dr. Aicha Hopkins

## 2022-08-13 ASSESSMENT — ENCOUNTER SYMPTOMS
SHORTNESS OF BREATH: 0
NAUSEA: 0
COUGH: 0
COLOR CHANGE: 1
BACK PAIN: 0
RHINORRHEA: 0
FACIAL SWELLING: 0

## 2022-08-13 NOTE — PROGRESS NOTES
8/5/2022   Chief Complaint   Patient presents with    Post-op Problem     Patient is concerned about infection following achilles tendon repair dos 6/28/22. History of Present Illness:                             Sylwia Pastor is a 50 y.o. male returns to our office today for continued postoperative care regarding his left Achilles tendon repair, DOS 6/30/2022. Patient states that he noticed some increased pain and redness from his incision area as well as some swelling and drainage from the distal third of the incision site. Patient did not note any obvious pussy drainage but that he had enough concern to call the office for an evaluation. His pain level has remained the same and he associates this with the typical her surgical healing process. He denies any systemic symptoms of infection such as fever or chills. Pain has improved overall, but recently has increased with suspected infection. Patient denies numbness/tingling. Medical History  Patient's medications, allergies, past medical, surgical, social and family histories were reviewed and updated as appropriate. Review of Systems   Constitutional:  Negative for fever. HENT:  Negative for facial swelling and rhinorrhea. Respiratory:  Negative for cough and shortness of breath. Cardiovascular:  Negative for chest pain. Gastrointestinal:  Negative for nausea. Musculoskeletal:  Positive for arthralgias, gait problem and joint swelling. Negative for back pain, myalgias, neck pain and neck stiffness. Skin:  Positive for color change and wound. Negative for pallor and rash. Neurological:  Negative for facial asymmetry and speech difficulty. Psychiatric/Behavioral:  Negative for agitation and confusion.                                               Examination:  General Exam:  Vitals: BP (!) 145/94 (Site: Left Upper Arm, Position: Sitting)   Pulse 50   Wt 212 lb (96.2 kg)   SpO2 98%   BMI 30.42 kg/m²    Physical Exam  Constitutional:       General: He is not in acute distress. Appearance: Normal appearance. He is normal weight. He is not ill-appearing. HENT:      Head: Normocephalic and atraumatic. Nose: No rhinorrhea. Eyes:      General: No scleral icterus. Right eye: No discharge. Left eye: No discharge. Cardiovascular:      Pulses: Normal pulses. Musculoskeletal:      Cervical back: Normal range of motion. Comments: Left lower extremity exam: Patient incision at the posterior ankle appears well approximated along the proximal two thirds of the incision area. At the distal one third of the incision there is a small 1 inch diameter area of increased erythema with some mild soft tissue swelling. The area was expressed and cultured. During expression of the area in question there was some drainage and small amounts of pus able to be visualized. Area was normal temperature. Dorsal pedal pulse 2+, brisk capillary refill. Sensation light touch intact throughout all surfaces of the left lower extremity. Jhon Damon' sign negative. Skin:     General: Skin is warm. Coloration: Skin is not jaundiced or pale. Findings: Erythema and lesion present. Neurological:      General: No focal deficit present. Mental Status: He is alert and oriented to person, place, and time. Psychiatric:         Mood and Affect: Mood normal.         Behavior: Behavior normal.            Diagnostic testing:  X-rays reviewed in office, I independently reviewed the films in the office today:     No images taken at today's visit. Office Procedures:  Orders Placed This Encounter   Procedures    Culture, MRSA, Screening     Left Achilles (Ankle Area)    Culture, Aerobic and Anaerobic     Left Achilles (Ankle Area)       Assessment and Plan  1. Status post left Achilles tendon repair, DOS 6/30/2022    2.   Suspicion of incisional wound infection    -I explained to the patient that there is a mild

## 2022-08-15 ENCOUNTER — OFFICE VISIT (OUTPATIENT)
Dept: ORTHOPEDIC SURGERY | Age: 47
End: 2022-08-15

## 2022-08-15 VITALS
HEART RATE: 62 BPM | SYSTOLIC BLOOD PRESSURE: 140 MMHG | DIASTOLIC BLOOD PRESSURE: 100 MMHG | BODY MASS INDEX: 28.41 KG/M2 | HEIGHT: 70 IN | OXYGEN SATURATION: 99 %

## 2022-08-15 DIAGNOSIS — Z98.890 STATUS POST ACHILLES TENDON REPAIR: Primary | ICD-10-CM

## 2022-08-15 PROCEDURE — 99024 POSTOP FOLLOW-UP VISIT: CPT | Performed by: ORTHOPAEDIC SURGERY

## 2022-08-15 NOTE — PATIENT INSTRUCTIONS
May start full weight bearing in left leg. Continue taking antibiotic. Continue range of motion exercises as instructed. Ice and elevate as needed. Tylenol or Motrin for pain. Follow up in 1 week.

## 2022-08-15 NOTE — PROGRESS NOTES
Patient seen in office today for 6 wk post op L Achilles Tendon repair DOS 6/30/22. Stated feeling better since starting antibiotic on Friday. Hasn't been wearing cam boot to let infection air out. Reported 2/10 pain level today, still using crutches for ambulation.

## 2022-08-16 ASSESSMENT — ENCOUNTER SYMPTOMS
COLOR CHANGE: 0
CHEST TIGHTNESS: 0
BACK PAIN: 0
ABDOMINAL PAIN: 0
EYE REDNESS: 0

## 2022-08-16 NOTE — PROGRESS NOTES
Subjective:      Patient ID: Greg Talamantes is a 50 y.o. male. Patient seen in office today for 6 wk post op L Achilles Tendon repair DOS 6/30/22. Stated feeling better since starting antibiotic on Friday. Hasn't been wearing cam boot to let infection air out. Reported 2/10 pain level today, still using crutches for ambulation. He comes in today for his 6 -week postop recheck. He states that since I saw him last he has been gradually decreasing the wedges in his walking boot. He did develop cellulitis and was placed on Bactrim which did not help but then since he has been on clindamycin since Friday he states that his pain and drainage have significantly improved. Patient denies any new injury to the involved extremity/ joint, denies numbness or tingling in the involved extremity and denies fever or chills. Review of Systems   Constitutional:  Negative for activity change, chills and fever. HENT:  Negative for congestion and drooling. Eyes:  Negative for redness. Respiratory:  Negative for chest tightness. Cardiovascular:  Negative for chest pain. Gastrointestinal:  Negative for abdominal pain. Endocrine: Negative for cold intolerance and heat intolerance. Musculoskeletal:  Positive for arthralgias, gait problem, joint swelling and myalgias. Negative for back pain. Skin:  Negative for color change, pallor, rash and wound. Neurological:  Positive for weakness. Negative for numbness. Psychiatric/Behavioral:  Negative for confusion. Past Medical History:   Diagnosis Date    Acid reflux     Hypertension     Sleep apnea        Objective:   Physical Exam  Constitutional:       Appearance: He is well-developed. HENT:      Head: Normocephalic and atraumatic. Eyes:      Pupils: Pupils are equal, round, and reactive to light. Pulmonary:      Effort: Pulmonary effort is normal.   Musculoskeletal:         General: Swelling present. No tenderness, deformity or signs of injury. Cervical back: Normal range of motion. Right knee: Normal.      Left knee: Normal.      Right ankle: No swelling, deformity, ecchymosis or lacerations. No tenderness. No lateral malleolus, medial malleolus, AITF ligament or proximal fibula tenderness. Normal range of motion. Normal pulse. Right Achilles Tendon: Normal.      Left ankle: Swelling present. No deformity, ecchymosis or lacerations. No lateral malleolus, medial malleolus, AITF ligament, CF ligament or proximal fibula tenderness. Decreased range of motion. Normal pulse. Left Achilles Tendon: Normal. No tenderness or defects. Valencia's test negative. Skin:     General: Skin is warm and dry. Capillary Refill: Capillary refill takes less than 2 seconds. Coloration: Skin is not pale. Findings: No bruising, erythema or rash. Neurological:      Mental Status: He is alert and oriented to person, place, and time. Left ankle-incision healing well, small area of induration, there are 2 small areas of skin dehiscence with granulation tissue present, no drainage. Valencia's test intact  Dorsiflexion 10  Plantarflexion 30      Assessment:      Left Achilles tendon repair, 6 weeks  Left ankle cellulitis, resolving      Plan:      I discussed with him today that he is continuing to progress very well. Continue clindamycin. Continue washing with soap and water only, discontinue peroxide and Betadine. He can come out of the walking boot and can begin walking in a regular shoe and can gradually wean off the crutches as tolerated. Continue wearing walking boot on unlevel ground or outdoors. No running, jumping, heavy lifting. Follow-up in 1 week for skin check and once he is doing better we will start formal physical therapy.                   Paula Figueroa, DO

## 2022-08-22 ENCOUNTER — OFFICE VISIT (OUTPATIENT)
Dept: ORTHOPEDIC SURGERY | Age: 47
End: 2022-08-22

## 2022-08-22 VITALS
SYSTOLIC BLOOD PRESSURE: 140 MMHG | BODY MASS INDEX: 28.35 KG/M2 | HEIGHT: 70 IN | DIASTOLIC BLOOD PRESSURE: 102 MMHG | WEIGHT: 198 LBS

## 2022-08-22 DIAGNOSIS — Z98.890 STATUS POST ACHILLES TENDON REPAIR: Primary | ICD-10-CM

## 2022-08-22 PROCEDURE — 99024 POSTOP FOLLOW-UP VISIT: CPT | Performed by: ORTHOPAEDIC SURGERY

## 2022-08-22 ASSESSMENT — ENCOUNTER SYMPTOMS
CHEST TIGHTNESS: 0
BACK PAIN: 0
COLOR CHANGE: 0
ABDOMINAL PAIN: 0
EYE REDNESS: 0

## 2022-08-22 NOTE — PATIENT INSTRUCTIONS
Continue weight-bearing as tolerated. Continue range of motion exercises as instructed. Ice and elevate as needed. Tylenol or Motrin for pain.    Follow up in 6 weeks  Start physical therapy

## 2022-08-22 NOTE — PROGRESS NOTES
Subjective:      Patient ID: Carolina Phelps is a 50 y.o. male. He comes in today for his 7-week postop recheck. He states that since I saw him last week he has been feeling much better. He completed his oral antibiotics on Friday morning. He states that around Thursday morning he felt as though something relaxed in the left ankle and since that time is been feeling much better. He does continue to have some clear drainage but is no longer having any pain, redness in the left ankle and his swelling has decreased significantly. He has been able to walk around in a normal shoe without any difficulty. Patient denies any new injury to the involved extremity/ joint, denies numbness or tingling in the involved extremity and denies fever or chills. Review of Systems   Constitutional:  Negative for activity change, chills and fever. HENT:  Negative for congestion and drooling. Eyes:  Negative for redness. Respiratory:  Negative for chest tightness. Cardiovascular:  Negative for chest pain. Gastrointestinal:  Negative for abdominal pain. Endocrine: Negative for cold intolerance and heat intolerance. Musculoskeletal:  Positive for gait problem. Negative for arthralgias, back pain, joint swelling and myalgias. Skin:  Negative for color change, pallor, rash and wound. Neurological:  Positive for weakness. Negative for numbness. Psychiatric/Behavioral:  Negative for confusion. Past Medical History:   Diagnosis Date    Acid reflux     Hypertension     Sleep apnea        Objective:   Physical Exam  Constitutional:       Appearance: He is well-developed. HENT:      Head: Normocephalic and atraumatic. Eyes:      Pupils: Pupils are equal, round, and reactive to light. Pulmonary:      Effort: Pulmonary effort is normal.   Musculoskeletal:         General: No swelling, tenderness, deformity or signs of injury. Cervical back: Normal range of motion.       Right knee: Normal. Left knee: Normal.      Right ankle: No swelling, deformity, ecchymosis or lacerations. No tenderness. No lateral malleolus, medial malleolus, AITF ligament or proximal fibula tenderness. Normal range of motion. Normal pulse. Right Achilles Tendon: Normal.      Left ankle: No swelling, deformity, ecchymosis or lacerations. No lateral malleolus, medial malleolus, AITF ligament, CF ligament or proximal fibula tenderness. Decreased range of motion. Normal pulse. Left Achilles Tendon: Normal. No tenderness or defects. Valencia's test negative. Skin:     General: Skin is warm and dry. Capillary Refill: Capillary refill takes less than 2 seconds. Coloration: Skin is not pale. Findings: No bruising, erythema or rash. Neurological:      Mental Status: He is alert and oriented to person, place, and time. Left ankle-incision healing well, area of induration has resolved, there remains significant granulation tissue present with small area of wound dehiscence, no purulence, mild clear drainage  Valencia's test intact  Dorsiflexion 10  Plantarflexion 30      Assessment:      Left Achilles tendon repair, 7 weeks        Plan:      I discussed with him today that he is continuing to progress very well. At this point I will get him started in formal physical therapy for strengthening and range of motion of the left ankle. Continue weightbearing as tolerated in the left foot in a regular shoe with no running, Continue wearing walking boot on unlevel ground or outdoors. Follow-up in 6 weeks for recheck.              Paula Figueroa, DO

## 2022-09-07 ENCOUNTER — OFFICE VISIT (OUTPATIENT)
Dept: ORTHOPEDIC SURGERY | Age: 47
End: 2022-09-07

## 2022-09-07 VITALS
SYSTOLIC BLOOD PRESSURE: 110 MMHG | DIASTOLIC BLOOD PRESSURE: 78 MMHG | HEART RATE: 65 BPM | HEIGHT: 70 IN | BODY MASS INDEX: 28.41 KG/M2 | OXYGEN SATURATION: 94 %

## 2022-09-07 DIAGNOSIS — Z98.890 S/P ACHILLES TENDON REPAIR: Primary | ICD-10-CM

## 2022-09-07 PROCEDURE — 99024 POSTOP FOLLOW-UP VISIT: CPT | Performed by: ORTHOPAEDIC SURGERY

## 2022-09-07 RX ORDER — CEPHALEXIN 250 MG/1
250 CAPSULE ORAL 4 TIMES DAILY
Qty: 40 CAPSULE | Refills: 0 | Status: SHIPPED | OUTPATIENT
Start: 2022-09-07 | End: 2022-09-15

## 2022-09-07 ASSESSMENT — ENCOUNTER SYMPTOMS
EYE REDNESS: 0
COLOR CHANGE: 0
ABDOMINAL PAIN: 0
CHEST TIGHTNESS: 0
BACK PAIN: 0

## 2022-09-07 NOTE — PROGRESS NOTES
Subjective:      Patient ID: Hosea Mares is a 50 y.o. male. Patient seen in office today for post op LT Achilles Tendon Repair DOS 6/30/22. Patient and wife concerned with wound opening again and having yellow drainage. Observed slight yellow drainage in wound. Stated in the AM, the swelling in ankle looks better. Patient was going to start PT yesterday but pushed it back d/t the wound opening up. Stated by middle of the day his wound will be more open d/t edema. He comes in today for his 2-1/2-month postop recheck. He states that since I saw him last he has continued having dehiscence as well as some clear yellow drainage from the incision site at the proximal aspect of the incision. Patient denies any new injury to the involved extremity/ joint, denies numbness or tingling in the involved extremity and denies fever or chills. Review of Systems   Constitutional:  Negative for activity change, chills and fever. HENT:  Negative for congestion and drooling. Eyes:  Negative for redness. Respiratory:  Negative for chest tightness. Cardiovascular:  Negative for chest pain. Gastrointestinal:  Negative for abdominal pain. Endocrine: Negative for cold intolerance and heat intolerance. Musculoskeletal:  Positive for gait problem. Negative for arthralgias, back pain, joint swelling and myalgias. Skin:  Negative for color change, pallor, rash and wound. Neurological:  Positive for weakness. Negative for numbness. Psychiatric/Behavioral:  Negative for confusion. Past Medical History:   Diagnosis Date    Acid reflux     Hypertension     Sleep apnea        Objective:   Physical Exam  Constitutional:       Appearance: He is well-developed. HENT:      Head: Normocephalic and atraumatic. Eyes:      Pupils: Pupils are equal, round, and reactive to light. Pulmonary:      Effort: Pulmonary effort is normal.   Musculoskeletal:         General: Swelling present.  No tenderness, deformity or signs of injury. Cervical back: Normal range of motion. Right knee: Normal.      Left knee: Normal.      Right ankle: No swelling, deformity, ecchymosis or lacerations. No tenderness. No lateral malleolus, medial malleolus, AITF ligament or proximal fibula tenderness. Normal range of motion. Normal pulse. Right Achilles Tendon: Normal.      Left ankle: No swelling, deformity, ecchymosis or lacerations. No lateral malleolus, medial malleolus, AITF ligament, CF ligament or proximal fibula tenderness. Decreased range of motion. Normal pulse. Left Achilles Tendon: Normal. No tenderness or defects. Valencia's test negative. Skin:     General: Skin is warm and dry. Capillary Refill: Capillary refill takes less than 2 seconds. Coloration: Skin is not pale. Findings: No bruising, erythema or rash. Neurological:      Mental Status: He is alert and oriented to person, place, and time. Left ankle-the distal incision has healed well, the proximal 2 cm has persistent superficial wound dehiscence with no purulence, mild clear drainage  Valencia's test intact  Dorsiflexion 10  Plantarflexion 30      Assessment:      Left Achilles tendon repair, 2 half months         Plan:      I discussed with him today that he is continuing to progress very well. He can hold off on formal physical therapy at this point. I gave her a prescription for 10 days of Keflex to be taken. I will also get him started at the wound clinic for further treatment of his wound dehiscence. Continue weight-bearing as tolerated. Continue range of motion exercises as instructed. Ice and elevate as needed. Tylenol or Motrin for pain. Follow up in 2 weeks for recheck.               Paula Figueroa, DO

## 2022-09-07 NOTE — PATIENT INSTRUCTIONS
Continue weight-bearing as tolerated. Continue range of motion exercises as instructed. Ice and elevate as needed. Tylenol or Motrin for pain. Keflex sent in. Referral to wound clinic. Follow up in 2 weeks with Sonia Mcgrath.

## 2022-09-07 NOTE — PROGRESS NOTES
Patient seen in office today for post op LT Achilles Tendon Repair DOS 6/30/22. Patient and wife concerned with wound opening again and having yellow drainage. Observed slight yellow drainage in wound. Stated in the AM, the swelling in ankle looks better. Patient was going to start PT yesterday but pushed it back d/t the wound opening up. Stated by middle of the day his wound will be more open d/t edema.

## 2022-09-15 ENCOUNTER — HOSPITAL ENCOUNTER (OUTPATIENT)
Dept: WOUND CARE | Age: 47
Discharge: HOME | End: 2022-09-15
Payer: COMMERCIAL

## 2022-09-15 VITALS
DIASTOLIC BLOOD PRESSURE: 77 MMHG | HEART RATE: 52 BPM | TEMPERATURE: 98.5 F | SYSTOLIC BLOOD PRESSURE: 134 MMHG | RESPIRATION RATE: 18 BRPM

## 2022-09-15 DIAGNOSIS — L97.222 LOWER LIMB ULCER, CALF, LEFT, WITH FAT LAYER EXPOSED (HCC): Primary | ICD-10-CM

## 2022-09-15 PROCEDURE — 87075 CULTR BACTERIA EXCEPT BLOOD: CPT

## 2022-09-15 PROCEDURE — 87077 CULTURE AEROBIC IDENTIFY: CPT

## 2022-09-15 PROCEDURE — 11042 DBRDMT SUBQ TIS 1ST 20SQCM/<: CPT

## 2022-09-15 PROCEDURE — 87070 CULTURE OTHR SPECIMN AEROBIC: CPT

## 2022-09-15 PROCEDURE — 87186 SC STD MICRODIL/AGAR DIL: CPT

## 2022-09-15 PROCEDURE — 11042 DBRDMT SUBQ TIS 1ST 20SQCM/<: CPT | Performed by: SURGERY

## 2022-09-15 PROCEDURE — 99203 OFFICE O/P NEW LOW 30 MIN: CPT | Performed by: SURGERY

## 2022-09-15 PROCEDURE — 99213 OFFICE O/P EST LOW 20 MIN: CPT

## 2022-09-15 RX ORDER — BACITRACIN ZINC AND POLYMYXIN B SULFATE 500; 1000 [USP'U]/G; [USP'U]/G
OINTMENT TOPICAL ONCE
Status: CANCELLED | OUTPATIENT
Start: 2022-09-15 | End: 2022-09-15

## 2022-09-15 RX ORDER — LIDOCAINE 40 MG/G
CREAM TOPICAL ONCE
Status: CANCELLED | OUTPATIENT
Start: 2022-09-15 | End: 2022-09-15

## 2022-09-15 RX ORDER — CLOBETASOL PROPIONATE 0.5 MG/G
OINTMENT TOPICAL ONCE
Status: CANCELLED | OUTPATIENT
Start: 2022-09-15 | End: 2022-09-15

## 2022-09-15 RX ORDER — GINSENG 100 MG
CAPSULE ORAL ONCE
Status: CANCELLED | OUTPATIENT
Start: 2022-09-15 | End: 2022-09-15

## 2022-09-15 RX ORDER — GENTAMICIN SULFATE 1 MG/G
OINTMENT TOPICAL ONCE
Status: CANCELLED | OUTPATIENT
Start: 2022-09-15 | End: 2022-09-15

## 2022-09-15 RX ORDER — LIDOCAINE HYDROCHLORIDE 20 MG/ML
JELLY TOPICAL ONCE
Status: CANCELLED | OUTPATIENT
Start: 2022-09-15 | End: 2022-09-15

## 2022-09-15 RX ORDER — BACITRACIN, NEOMYCIN, POLYMYXIN B 400; 3.5; 5 [USP'U]/G; MG/G; [USP'U]/G
OINTMENT TOPICAL ONCE
Status: CANCELLED | OUTPATIENT
Start: 2022-09-15 | End: 2022-09-15

## 2022-09-15 RX ORDER — LIDOCAINE 50 MG/G
OINTMENT TOPICAL ONCE
Status: CANCELLED | OUTPATIENT
Start: 2022-09-15 | End: 2022-09-15

## 2022-09-15 RX ORDER — LIDOCAINE HYDROCHLORIDE 40 MG/ML
SOLUTION TOPICAL ONCE
Status: CANCELLED | OUTPATIENT
Start: 2022-09-15 | End: 2022-09-15

## 2022-09-15 RX ORDER — BETAMETHASONE DIPROPIONATE 0.05 %
OINTMENT (GRAM) TOPICAL ONCE
Status: CANCELLED | OUTPATIENT
Start: 2022-09-15 | End: 2022-09-15

## 2022-09-15 RX ORDER — GENTAMICIN SULFATE 1 MG/G
OINTMENT TOPICAL ONCE
Status: DISCONTINUED | OUTPATIENT
Start: 2022-09-15 | End: 2022-09-16 | Stop reason: HOSPADM

## 2022-09-15 ASSESSMENT — PAIN DESCRIPTION - ORIENTATION: ORIENTATION: LEFT

## 2022-09-15 ASSESSMENT — PAIN - FUNCTIONAL ASSESSMENT: PAIN_FUNCTIONAL_ASSESSMENT: PREVENTS OR INTERFERES SOME ACTIVE ACTIVITIES AND ADLS

## 2022-09-15 ASSESSMENT — PAIN DESCRIPTION - PAIN TYPE: TYPE: ACUTE PAIN

## 2022-09-15 ASSESSMENT — PAIN DESCRIPTION - LOCATION: LOCATION: FOOT

## 2022-09-15 ASSESSMENT — PAIN SCALES - GENERAL: PAINLEVEL_OUTOF10: 3

## 2022-09-15 ASSESSMENT — PAIN DESCRIPTION - ONSET: ONSET: ON-GOING

## 2022-09-15 ASSESSMENT — PAIN DESCRIPTION - FREQUENCY: FREQUENCY: CONTINUOUS

## 2022-09-15 NOTE — PROGRESS NOTES
Multilayer Compression Wrap   (Not Unna) Below the Knee    NAME:  Risa Cherry  YOB: 1975  MEDICAL RECORD NUMBER:  [id number]  DATE:  9/15/2022    Multilayer compression wrap: Removed old Multilayer wrap if indicated and wash leg with mild soap/water. Applied moisturizing agent to dry skin as needed. Applied primary and secondary dressing as ordered. Applied multilayered dressing below the knee to left lower leg. Instructed patient/caregiver not to remove dressing and to keep it clean and dry. Instructed patient/caregiver on complications to report to provider, such as pain, numbness in toes, heavy drainage, and slippage of dressing. Instructed patient on purpose of compression dressing and on activity and exercise recommendations.       Electronically signed by Rosy Landaverde RN on 9/15/2022 at 3:47 PM

## 2022-09-15 NOTE — PROGRESS NOTES
[facility] Initial Visit      Mumtaz Pierre  AGE: 50 y.o. GENDER: male  : 1975  EPISODE DATE:  9/15/2022   Referred by: Dominique Haider DO     Subjective:     CHIEF COMPLAINT: nonhealing ulcer post achilles tendon surgery     HISTORY of PRESENT ILLNESS      Mumtaz Pierre is a 50 y.o. male who presents to the [address] [place] [address] for an initial visit for evaluation and treatment of Chronic non-healing surgical  ulcer(s) of  L lower leg posterior. The condition is of moderate severity. The ulcer has been present for about 3 months - he had surgery 22 for repair of the left achilles tendon. Postoperatively he did ok at first, but then developed an open area and concern for infection. Cultures grew MSSA. The underlying cause is thought to be nonhealing surgical.  The patients care to date has included antibiotics (Keflex) which he completed recently, elevation, and dressings. More recently he has been leaving it open to air. The patient has significant underlying medical conditions as below. Wound Pain Timing/Severity: waxing and waning, moderate  Quality of pain: dull, aching, throbbing  Severity of pain:  4 / 10   Modifying Factors: edema, venous stasis, decreased mobility, and shear force  Associated Signs/Symptoms: edema, erythema, drainage, and pain    Patient educated on the 6 essential components necessary for wound healing: Circulation, Debridements, Proper Dressings and Topical Wound Products, Infection Control, Edema Control and Offloading. Patient educated on those factors that negatively effect or impact wound healing: smoking, obesity, uncontrolled diabetes, anticoagulant and immunosuppressive regimens, inadequate nutrition, untreated arterial and venous disease if applicable and measures to manage edema.          PAST MEDICAL HISTORY        Diagnosis Date    Acid reflux     Hypertension     Sleep apnea        PAST SURGICAL HISTORY    Past Surgical History:   Procedure Laterality Date    ACHILLES TENDON SURGERY Left 06/30/2022    Left achilles tendon lengthening repair-By Dr. Shruthi Toure in [address] Left 6/30/2022    LEFT ACHILLES TENDON LENGTHENING REPAIR performed by Zandra Lopez DO at [facility] [place]    Family History   Problem Relation Age of Onset    Cancer Father     High Blood Pressure Father        SOCIAL HISTORY    Social History     Tobacco Use    Smoking status: Never    Smokeless tobacco: Current     Types: Chew   Vaping Use    Vaping Use: Never used   Substance Use Topics    Alcohol use: Yes     Comment: 4 times/week     Drug use: Never       ALLERGIES    No Known Allergies    MEDICATIONS    Current Outpatient Medications on File Prior to Encounter   Medication Sig Dispense Refill    sildenafil (VIAGRA) 100 MG tablet Take 100 mg by mouth as needed      aspirin 81 MG EC tablet Take 81 mg by mouth daily      omeprazole (PRILOSEC) 40 MG delayed release capsule Take 40 mg by mouth daily       amLODIPine (NORVASC) 5 MG tablet Take 5 mg by mouth daily       bisoprolol-hydroCHLOROthiazide (ZIAC) 5-6.25 MG per tablet TAKE 1 TABLET DAILY FOR BLOOD PRESSURE      amphetamine-dextroamphetamine (ADDERALL XR) 10 MG extended release capsule TAKE 1 CAPSULE (10 MG TOTAL) BY MOUTH 2 TIMES A DAY. zolpidem (AMBIEN) 10 MG tablet Take 10 mg by mouth nightly as needed. fexofenadine (ALLEGRA) 180 MG tablet Take 180 mg by mouth daily as needed        No current facility-administered medications on file prior to encounter.        PROBLEM LIST    Patient Active Problem List   Diagnosis    WD - Lower limb ulcer, calf, left, with fat layer exposed ([facility] [address]       REVIEW OF SYSTEMS    A comprehensive review of systems was negative except for: Cardiovascular: positive for lower extremity edema  Integument/breast: positive for skin lesion(s)  Musculoskeletal: positive for myalgias      Objective:      /77   Pulse 52   Temp 98.5 °F (36.9 °C) (Temporal)   Resp 18     PHYSICAL EXAM    General Appearance:   Alert, cooperative, no distress    Head:   Normocephalic, without obvious abnormality, atraumatic    Eyes:   PERRL, conjunctiva/corneas clear    Ears:   Normal external appearance, hearing grossly intact    Nose:  Nares normal, mucosa normal, no drainage    Throat:  Lips, mucosa, and tongue normal    Neck:  Supple, trachea midline    Respiratory:   Equal chest rise, respirations unlabored, no wheezing   Cardiovascular:   Regular rate and rhythm, 1+ edema of the left lower extremity to the knee   Abdomen, GI:   Soft, non-tender, no rebound or guarding    Musculoskeletal:  Atraumatic, no cyanosis   Neurologic:  Nonfocal, strength & sensation grossly intact   Psychiatric: Oriented x3, grossly appropriate judgement and insight   Dermatologic exam: Visual inspection of the periwound reveals the skin to be edematous  Wound exam: see wound description below in procedure note      Assessment:       Hosea Mares  appears to have a non-healing wound of the posterior left leg. The etiology of the wound is felt to be non-healing surgical. There are multiple complicating factors including edema, venous stasis, decreased mobility, and shear force. A comprehensive wound management program would be helpful to heal this wound. Assessments completed include fall risk and nutritional, functional,and psychological status. At this time appropriate care would include: periodic debridement and wound care as below. Problem List Items Addressed This Visit          Other    WD - Lower limb ulcer, calf, left, with fat layer exposed ([address]         Procedure Note    Indications:  Based on my examination of this patient's wound(s) today, sharp excision into necrotic epidermis, dermis, and subcutaneous tissue is required to promote healing and evaluate the extent of previous healing.     Performed by: Chitra Glez MD    Consent obtained: Yes    Time out taken: Yes    Pain Control: topical lidocaine       Debridement:Excisional Debridement    Using curette the wound(s) was/were sharply debrided down through and including the removal of epidermis, dermis, and subcutaneous tissue. Devitalized Tissue Debrided:  fibrin, necrotic/eschar, and callus    Pre Debridement Measurements:  Are located in the Wound Documentation Flow Sheet    All active wounds listed below with today's date are evaluated  Wound(s)    debrided this date include # : 1     Post  Debridement Measurements:  Wound 09/15/22 Wound #1 Left Achilles (Active)   Wound Image   09/15/22 1417   Wound Etiology Non-Healing Surgical 09/15/22 1417   Wound Cleansed Wound cleanser 09/15/22 1417   Wound Length (cm) 5 cm 09/15/22 1417   Wound Width (cm) 1.1 cm 09/15/22 1417   Wound Depth (cm) 0.4 cm 09/15/22 1417   Wound Surface Area (cm^2) 5.5 cm^2 09/15/22 1417   Wound Volume (cm^3) 2.2 cm^3 09/15/22 1417   Post-Procedure Length (cm) 5 cm 09/15/22 1441   Post-Procedure Width (cm) 1.1 cm 09/15/22 1441   Post-Procedure Depth (cm) 0.4 cm 09/15/22 1441   Post-Procedure Surface Area (cm^2) 5.5 cm^2 09/15/22 1441   Post-Procedure Volume (cm^3) 2.2 cm^3 09/15/22 1441   Distance Tunneling (cm) 0 cm 09/15/22 1417   Tunneling Position ___ O'Clock 0 09/15/22 1417   Undermining Starts ___ O'Clock 0 09/15/22 1417   Undermining Ends___ O'Clock 0 09/15/22 1417   Undermining Maxium Distance (cm) 0 09/15/22 1417   Wound Assessment Slough;Pink/red 09/15/22 1417   Drainage Amount Moderate 09/15/22 1417   Drainage Description Serosanguinous 09/15/22 1417   Odor None 09/15/22 1417   Saima-wound Assessment Dry/flaky;Fragile; Intact 09/15/22 1417   Margins Defined edges 09/15/22 1417   Wound Thickness Description not for Pressure Injury Full thickness 09/15/22 1417   Number of days: 0       Percent of Wound(s) Debrided: 100%    Total  Area  Debrided:  5.5 sq cm     Bleeding:  Minimal    Hemostasis Achieved:  by pressure    Procedural Pain: 4  / 10     Post Procedural Pain:  3 / 10     Response to treatment:  Well tolerated by patient., With complaints of pain. Plan:   Ulcer left calf - gent and stimulen, absorbent layer (alginate), ABD pads  Culture obtained today - previously grew MSSA sensitive to gentamicin - will use gent in the wound bed with dressing changes for now. Edema - normal ABIs, will use a Coban2 Lite this week, nurse visit Monday to change  Follow up in 1 week for MD visit    Discharge Instructions         [address]    NOTE: Upon discharge from the [address], you will receive a patient experience survey. We would be grateful if you would take the time to fill this survey out. Wound care order history:     ANTOINE's   Right       Left    Date:   Cultures:  Left achilles heel wound cultured 9/15/22   Grafts:     Antibiotics:        Continuing wound care orders and information:                Residence:                Continue home health care with:    Your wound-care supplies will be provided by: Wound cleansing:     Do not scrub or use excessive force. Wash hands with soap and water before and after dressing changes. Prior to applying a clean dressing, cleanse wound with normal saline, wound cleanser, or mild soap and water. Ask the physician or nurse before getting the wound(s) wet in a shower    Daily Wound management:   Keep weight off wounds and reposition every 2 hours. Avoid standing for long periods of time. Apply wraps/stockings in AM and remove at bedtime. If swelling is present, elevate legs to the level of the heart or above for 30 minutes 4-5 times a day and/or when sitting. When taking antibiotics take entire prescription as ordered by physician do not stop taking until medicine is all gone. Orders for this week: 9/15/22    Left achilles heel wound cultured 9/15/22       Rx:    Left Achilles wound - Wash with soap and water, pat dry.  Apply Gentamicin and Stimulen to the wound bed. Cover with ca alginate and ABD. Wrap with Coban 2 Lite. Leave in place until nurse visit on Monday. Be sure to keep dry (do sponge bath or wear a cast protector sleeve while showering). Follow up with Dr So Tan in 1 week in the wound care center. Call [phone] for any questions or concerns.   Date__________   Time____________          Treatment Note      Written Patient Dismissal Instructions Given          Electronically signed by Dimas Lafleur MD on 9/15/2022 at 2:57 PM

## 2022-09-15 NOTE — DISCHARGE INSTRUCTIONS
PHYSICIAN ORDERS AND DISCHARGE INSTRUCTIONS    NOTE: Upon discharge from the [address], you will receive a patient experience survey. We would be grateful if you would take the time to fill this survey out. Wound care order history:     ANTOINE's   Right       Left    Date:   Cultures:  Left achilles heel wound cultured 9/15/22   Grafts:     Antibiotics:        Continuing wound care orders and information:                Residence:                Continue home health care with:    Your wound-care supplies will be provided by: Wound cleansing:     Do not scrub or use excessive force. Wash hands with soap and water before and after dressing changes. Prior to applying a clean dressing, cleanse wound with normal saline, wound cleanser, or mild soap and water. Ask the physician or nurse before getting the wound(s) wet in a shower    Daily Wound management:   Keep weight off wounds and reposition every 2 hours. Avoid standing for long periods of time. Apply wraps/stockings in AM and remove at bedtime. If swelling is present, elevate legs to the level of the heart or above for 30 minutes 4-5 times a day and/or when sitting. When taking antibiotics take entire prescription as ordered by physician do not stop taking until medicine is all gone. Orders for this week: 9/15/22    Left achilles heel wound cultured 9/15/22       Rx:    Left Achilles wound - Wash with soap and water, pat dry. Apply Gentamicin and Stimulen to the wound bed. Cover with ca alginate and ABD. Wrap with Coban 2 Lite. Leave in place until nurse visit on Monday. Be sure to keep dry (do sponge bath or wear a cast protector sleeve while showering). Follow up with Dr Zuleyma Gaviria in 1 week in the wound care center. Call [phone] for any questions or concerns.   Date__________   Time____________

## 2022-09-19 ENCOUNTER — HOSPITAL ENCOUNTER (OUTPATIENT)
Dept: WOUND CARE | Age: 47
Discharge: HOME | End: 2022-09-19
Payer: COMMERCIAL

## 2022-09-19 VITALS
DIASTOLIC BLOOD PRESSURE: 87 MMHG | TEMPERATURE: 97.5 F | HEART RATE: 50 BPM | RESPIRATION RATE: 18 BRPM | SYSTOLIC BLOOD PRESSURE: 142 MMHG

## 2022-09-19 DIAGNOSIS — L97.222 LOWER LIMB ULCER, CALF, LEFT, WITH FAT LAYER EXPOSED (HCC): Primary | ICD-10-CM

## 2022-09-19 PROCEDURE — 29581 APPL MULTLAYER CMPRN SYS LEG: CPT

## 2022-09-19 PROCEDURE — 99213 OFFICE O/P EST LOW 20 MIN: CPT | Performed by: NURSE PRACTITIONER

## 2022-09-19 RX ORDER — HYDROXYZINE 50 MG/1
50 TABLET, FILM COATED ORAL EVERY 12 HOURS PRN
Qty: 30 TABLET | Refills: 0 | Status: SHIPPED | OUTPATIENT
Start: 2022-09-19 | End: 2022-10-04

## 2022-09-19 ASSESSMENT — PAIN SCALES - GENERAL: PAINLEVEL_OUTOF10: 2

## 2022-09-19 ASSESSMENT — PAIN DESCRIPTION - ORIENTATION: ORIENTATION: LEFT

## 2022-09-19 ASSESSMENT — PAIN DESCRIPTION - PAIN TYPE: TYPE: ACUTE PAIN

## 2022-09-19 ASSESSMENT — PAIN - FUNCTIONAL ASSESSMENT: PAIN_FUNCTIONAL_ASSESSMENT: PREVENTS OR INTERFERES SOME ACTIVE ACTIVITIES AND ADLS

## 2022-09-19 ASSESSMENT — PAIN DESCRIPTION - DESCRIPTORS: DESCRIPTORS: ITCHING

## 2022-09-19 ASSESSMENT — PAIN DESCRIPTION - FREQUENCY: FREQUENCY: INTERMITTENT

## 2022-09-19 ASSESSMENT — PAIN DESCRIPTION - ONSET: ONSET: ON-GOING

## 2022-09-19 ASSESSMENT — PAIN DESCRIPTION - LOCATION: LOCATION: FOOT

## 2022-09-20 LAB
CULTURE: ABNORMAL
CULTURE: ABNORMAL
Lab: ABNORMAL
SPECIMEN: ABNORMAL

## 2022-09-20 NOTE — PROGRESS NOTES
Clyde Toro  AGE: 50 y.o. GENDER: male  : 1975  EPISODE DATE:  2022   Referred by: Giovani Garcias DO     Subjective:     CHIEF COMPLAINT: nonhealing ulcer post achilles tendon surgery     HISTORY of PRESENT ILLNESS      Elyssa Melendez is a 50 y.o. male who presents to the [address] [place] [address] for evaluation and treatment of Chronic non-healing surgical  ulcer(s) of left posterior lower leg. The condition is of moderate severity. The ulcer has been present for approximately 3 months - had surgery to repair the left achilles tendon 22. He developed an open area and concern for infection. Cultures grew MSSA. The underlying cause is thought to be nonhealing surgical. The patients care to date has included antibiotics (Keflex) which he completed recently, elevation, and dressings. More recently he has been leaving it open to air. The patient has significant underlying medical conditions as below. Wound Pain Timing/Severity: waxing and waning, moderate  Quality of pain: dull, aching, throbbing  Severity of pain:  3 / 10   Modifying Factors: edema, venous stasis, decreased mobility, and shear force  Associated Signs/Symptoms: edema, erythema, drainage, and pain    ANTOINE: 9/15/22    Wound infection: wound culture will be obtained as needed for symptoms of infection 9/15/22      Arterial evaluation: if indicated based on wound, location, symptoms and healing    Venous Evaluation: if indicated based on wound, location, symptoms and healing    Diabetes: No  Smoking: Never, does chew tobacco  Anticoagulant/Antiplatelet therapy:Low dose aspirin  Immunosuppression:No  Obesity: Yes  Other History: HTN, acid reflux    Patient educated on the 6 essential components necessary for wound healing: Circulation, Debridements, Proper Dressings and Topical Wound Products, Infection Control, Edema Control and Offloading.      Patient educated on those factors that negatively effect or impact wound healing: smoking, obesity, uncontrolled diabetes, anticoagulant and immunosuppressive regimens, inadequate nutrition, untreated arterial and venous disease if applicable and measures to manage edema. PAST MEDICAL HISTORY        Diagnosis Date    Acid reflux     Hypertension     Sleep apnea        PAST SURGICAL HISTORY    Past Surgical History:   Procedure Laterality Date    ACHILLES TENDON SURGERY Left 06/30/2022    Left achilles tendon lengthening repair-By Dr. Vessie Brittle in [address] Left 6/30/2022    LEFT ACHILLES TENDON LENGTHENING REPAIR performed by Shelley Cedillo DO at [facility]    Family History   Problem Relation Age of Onset    Cancer Father     High Blood Pressure Father        SOCIAL HISTORY    Social History     Tobacco Use    Smoking status: Never    Smokeless tobacco: Current     Types: Chew   Vaping Use    Vaping Use: Never used   Substance Use Topics    Alcohol use: Yes     Comment: 4 times/week     Drug use: Never       ALLERGIES    No Known Allergies    MEDICATIONS    Current Outpatient Medications on File Prior to Encounter   Medication Sig Dispense Refill    sildenafil (VIAGRA) 100 MG tablet Take 100 mg by mouth as needed      aspirin 81 MG EC tablet Take 81 mg by mouth daily      omeprazole (PRILOSEC) 40 MG delayed release capsule Take 40 mg by mouth daily       amLODIPine (NORVASC) 5 MG tablet Take 5 mg by mouth daily       bisoprolol-hydroCHLOROthiazide (ZIAC) 5-6.25 MG per tablet TAKE 1 TABLET DAILY FOR BLOOD PRESSURE      amphetamine-dextroamphetamine (ADDERALL XR) 10 MG extended release capsule TAKE 1 CAPSULE (10 MG TOTAL) BY MOUTH 2 TIMES A DAY. zolpidem (AMBIEN) 10 MG tablet Take 10 mg by mouth nightly as needed. fexofenadine (ALLEGRA) 180 MG tablet Take 180 mg by mouth daily as needed        No current facility-administered medications on file prior to encounter.        PROBLEM LIST    Patient Active Problem List   Diagnosis    WD - Lower limb ulcer, calf, left, with fat layer exposed ([address]       REVIEW OF SYSTEMS    A comprehensive review of systems was negative except for: Cardiovascular: positive for lower extremity edema  Integument/breast: positive for skin lesion(s)  Musculoskeletal: positive for myalgias      Objective:      BP (!) 142/87   Pulse 50   Temp 97.5 °F (36.4 °C) (Temporal)   Resp 18     PHYSICAL EXAM    General Appearance:   Alert, cooperative, no distress    Head:   Normocephalic, without obvious abnormality, atraumatic    Eyes:   PERRL, conjunctiva/corneas clear    Ears:   Normal external appearance, hearing grossly intact    Nose:  Nares normal, mucosa normal, no drainage    Throat:  Lips, mucosa, and tongue normal    Neck:  Supple, trachea midline    Respiratory:   Equal chest rise, respirations unlabored, no wheezing   Cardiovascular:   Regular rate and rhythm, 1+ edema of the left lower extremity to the knee   Abdomen, GI:   Soft, non-tender, no rebound or guarding    Musculoskeletal:  Atraumatic, no cyanosis   Neurologic:  Nonfocal, strength & sensation grossly intact   Psychiatric: Oriented x3, grossly appropriate judgement and insight   Dermatologic exam: Visual inspection of the periwound reveals the skin to be edematous  Wound exam: see wound description below in procedure note      Assessment:       Elyssa Melendez  appears to have a non-healing wound of the posterior left leg. The etiology of the wound is felt to be non-healing surgical. There are multiple complicating factors including edema, venous stasis, decreased mobility, and shear force. A comprehensive wound management program would be helpful to heal this wound. Assessments completed include fall risk and nutritional, functional,and psychological status. At this time appropriate care would include: periodic debridement and wound care as below.      Problem List Items Addressed This Visit          Other    WD - Lower limb ulcer, calf, left, with fat layer exposed ([address] - Primary     Wound 09/15/22 Wound #1 Left Achilles (Active)   Wound Image   09/15/22 1417   Wound Etiology Non-Healing Surgical 09/19/22 1458   Dressing Status New dressing applied 09/19/22 1458   Wound Cleansed Soap and water 09/19/22 1458   Wound Length (cm) 3.5 cm 09/19/22 1458   Wound Width (cm) 1 cm 09/19/22 1458   Wound Depth (cm) 0.1 cm 09/19/22 1458   Wound Surface Area (cm^2) 3.5 cm^2 09/19/22 1458   Change in Wound Size % (l*w) 36.36 09/19/22 1458   Wound Volume (cm^3) 0.35 cm^3 09/19/22 1458   Wound Healing % 84 09/19/22 1458   Post-Procedure Length (cm) 5 cm 09/15/22 1441   Post-Procedure Width (cm) 1.1 cm 09/15/22 1441   Post-Procedure Depth (cm) 0.4 cm 09/15/22 1441   Post-Procedure Surface Area (cm^2) 5.5 cm^2 09/15/22 1441   Post-Procedure Volume (cm^3) 2.2 cm^3 09/15/22 1441   Distance Tunneling (cm) 0 cm 09/19/22 1458   Tunneling Position ___ O'Clock 0 09/19/22 1458   Undermining Starts ___ O'Clock 0 09/19/22 1458   Undermining Ends___ O'Clock 0 09/19/22 1458   Undermining Maxium Distance (cm) 0 09/19/22 1458   Wound Assessment Slough;Jenks/red 09/19/22 1458   Drainage Amount Small 09/19/22 1458   Drainage Description Serosanguinous; Yellow 09/19/22 1458   Odor None 09/19/22 1458   Saima-wound Assessment Blanchable erythema; Intact;Dry/flaky 09/19/22 1458   Margins Defined edges 09/19/22 1458   Wound Thickness Description not for Pressure Injury Full thickness 09/19/22 1458   Number of days: 4     Wound status: The patient was here for a nurse visit but switched to a provider visit as the patient was having itching with the wraps and questioned his wound culture results. The patient has some rash on the posterior calf that we will use clobetasol to treat, will also add atarax to use. The wound culture as above does have growth that is treated with the current regimen of Gent topically.  He has no S&S local or systemic infection and his wound measurements have improved, so at this time will use the topical regimen. To follow with Dr. Rick Olivera. Plan:     PHYSICIAN ORDERS AND DISCHARGE INSTRUCTIONS     NOTE: Upon discharge from the [address], you will receive a patient experience survey. We would be grateful if you would take the time to fill this survey out. Wound care order history:                 ANTOINE's   Right       Left               Date:              Cultures:  Left achilles heel wound cultured 9/15/22              Grafts:                Antibiotics:           Continuing wound care orders and information:                 Residence:                Continue home health care with:               Your wound-care supplies will be provided by: Wound cleansing:               Do not scrub or use excessive force. Wash hands with soap and water before and after dressing changes. Prior to applying a clean dressing, cleanse wound with normal saline, wound cleanser, or mild soap and water. Ask the physician or nurse before getting the wound(s) wet in a shower     Daily Wound management:              Keep weight off wounds and reposition every 2 hours. Avoid standing for long periods of time. Apply wraps/stockings in AM and remove at bedtime. If swelling is present, elevate legs to the level of the heart or above for 30 minutes 4-5 times a day and/or when sitting. When taking antibiotics take entire prescription as ordered by physician do not stop taking until medicine is all gone. Orders for this week: 9/19/22     Left achilles heel wound cultured 9/15/22             Rx:     Left Achilles wound - Wash with soap and water, pat dry. Apply Gentamicin and Stimulen to the wound bed. Cover with ca alginate and ABD. Wrap with Coban 2 Lite. Leave in place until nurse visit on Monday. Be sure to keep dry (do sponge bath or wear a cast protector sleeve while showering). Follow up with Dr Sotelo in 1 week in the wound care center. Call [phone] for any questions or concerns.   Date__________   Time____________              Treatment Note       Written Patient Dismissal Instructions Given           Electronically signed by ROSA Vargas CNP on 9/20/2022 at 1:58 PM

## 2022-09-23 ENCOUNTER — HOSPITAL ENCOUNTER (OUTPATIENT)
Dept: WOUND CARE | Age: 47
Discharge: HOME | End: 2022-09-23
Payer: COMMERCIAL

## 2022-09-23 VITALS
SYSTOLIC BLOOD PRESSURE: 132 MMHG | TEMPERATURE: 97.2 F | HEART RATE: 45 BPM | RESPIRATION RATE: 18 BRPM | DIASTOLIC BLOOD PRESSURE: 86 MMHG

## 2022-09-23 DIAGNOSIS — L97.222 LOWER LIMB ULCER, CALF, LEFT, WITH FAT LAYER EXPOSED (HCC): Primary | ICD-10-CM

## 2022-09-23 PROCEDURE — 11042 DBRDMT SUBQ TIS 1ST 20SQCM/<: CPT | Performed by: NURSE PRACTITIONER

## 2022-09-23 PROCEDURE — 11042 DBRDMT SUBQ TIS 1ST 20SQCM/<: CPT

## 2022-09-23 RX ORDER — LIDOCAINE HYDROCHLORIDE 40 MG/ML
SOLUTION TOPICAL ONCE
Status: CANCELLED | OUTPATIENT
Start: 2022-09-23 | End: 2022-09-23

## 2022-09-23 RX ORDER — LIDOCAINE 50 MG/G
OINTMENT TOPICAL ONCE
Status: CANCELLED | OUTPATIENT
Start: 2022-09-23 | End: 2022-09-23

## 2022-09-23 RX ORDER — BACITRACIN, NEOMYCIN, POLYMYXIN B 400; 3.5; 5 [USP'U]/G; MG/G; [USP'U]/G
OINTMENT TOPICAL ONCE
Status: CANCELLED | OUTPATIENT
Start: 2022-09-23 | End: 2022-09-23

## 2022-09-23 RX ORDER — LIDOCAINE HYDROCHLORIDE 20 MG/ML
JELLY TOPICAL ONCE
Status: CANCELLED | OUTPATIENT
Start: 2022-09-23 | End: 2022-09-23

## 2022-09-23 RX ORDER — BETAMETHASONE DIPROPIONATE 0.05 %
OINTMENT (GRAM) TOPICAL ONCE
Status: CANCELLED | OUTPATIENT
Start: 2022-09-23 | End: 2022-09-23

## 2022-09-23 RX ORDER — BACITRACIN ZINC AND POLYMYXIN B SULFATE 500; 1000 [USP'U]/G; [USP'U]/G
OINTMENT TOPICAL ONCE
Status: CANCELLED | OUTPATIENT
Start: 2022-09-23 | End: 2022-09-23

## 2022-09-23 RX ORDER — GENTAMICIN SULFATE 1 MG/G
OINTMENT TOPICAL ONCE
Status: CANCELLED | OUTPATIENT
Start: 2022-09-23 | End: 2022-09-23

## 2022-09-23 RX ORDER — LIDOCAINE 40 MG/G
CREAM TOPICAL ONCE
Status: CANCELLED | OUTPATIENT
Start: 2022-09-23 | End: 2022-09-23

## 2022-09-23 RX ORDER — CLOBETASOL PROPIONATE 0.5 MG/G
OINTMENT TOPICAL ONCE
Status: CANCELLED | OUTPATIENT
Start: 2022-09-23 | End: 2022-09-23

## 2022-09-23 RX ORDER — GINSENG 100 MG
CAPSULE ORAL ONCE
Status: CANCELLED | OUTPATIENT
Start: 2022-09-23 | End: 2022-09-23

## 2022-09-23 ASSESSMENT — PAIN SCALES - GENERAL: PAINLEVEL_OUTOF10: 1

## 2022-09-23 ASSESSMENT — PAIN DESCRIPTION - DESCRIPTORS: DESCRIPTORS: ITCHING

## 2022-09-23 NOTE — PROGRESS NOTES
Multilayer Compression Wrap   (Not Unna) Below the Knee    NAME:  Carolina Phelps  YOB: 1975  MEDICAL RECORD NUMBER:  [id number]  DATE:  9/23/2022    Multilayer compression wrap: Removed old Multilayer wrap if indicated and wash leg with mild soap/water. Applied moisturizing agent to dry skin as needed. Applied primary and secondary dressing as ordered. Applied multilayered dressing below the knee to left lower leg. Instructed patient/caregiver not to remove dressing and to keep it clean and dry. Instructed patient/caregiver on complications to report to provider, such as pain, numbness in toes, heavy drainage, and slippage of dressing. Instructed patient on purpose of compression dressing and on activity and exercise recommendations.       Electronically signed by Kevin Booker LPN on 8/48/4569 at 38:62 AM

## 2022-09-23 NOTE — PROGRESS NOTES
Clyde Toro  AGE: 50 y.o. GENDER: male  : 1975  EPISODE DATE:  2022   Referred by: Rashawn Greene DO     Subjective:     CHIEF COMPLAINT: nonhealing ulcer post achilles tendon surgery     HISTORY of PRESENT ILLNESS      Shira Steward is a 50 y.o. male who presents to the [address] [place] [address] for evaluation and treatment of Chronic non-healing surgical  ulcer(s) of left posterior lower leg. The condition is of moderate severity. The ulcer has been present for approximately 3 months - had surgery to repair the left achilles tendon 22. He developed an open area and concern for infection. Cultures grew MSSA. The underlying cause is thought to be nonhealing surgical. The patients care to date has included antibiotics (Keflex) which he completed recently, elevation, and dressings. More recently he has been leaving it open to air. The patient has significant underlying medical conditions as below. Wound Pain Timing/Severity: waxing and waning, moderate  Quality of pain: dull, aching, throbbing  Severity of pain:  3 / 10   Modifying Factors: edema, venous stasis, decreased mobility, and shear force  Associated Signs/Symptoms: edema, erythema, drainage, and pain    ANTOINE: 9/15/22    Wound infection: wound culture will be obtained as needed for symptoms of infection 9/15/22      Arterial evaluation: if indicated based on wound, location, symptoms and healing    Venous Evaluation: if indicated based on wound, location, symptoms and healing    Diabetes: No  Smoking: Never, does chew tobacco  Anticoagulant/Antiplatelet therapy:Low dose aspirin  Immunosuppression:No  Obesity: Yes  Other History: HTN, acid reflux    Patient educated on the 6 essential components necessary for wound healing: Circulation, Debridements, Proper Dressings and Topical Wound Products, Infection Control, Edema Control and Offloading.      Patient educated on those factors that negatively effect or No current facility-administered medications on file prior to encounter. PROBLEM LIST    Patient Active Problem List   Diagnosis    WD - Lower limb ulcer, calf, left, with fat layer exposed ([address]       REVIEW OF SYSTEMS    A comprehensive review of systems was negative except for: Cardiovascular: positive for lower extremity edema  Integument/breast: positive for skin lesion(s)  Musculoskeletal: positive for myalgias      Objective:      /86   Pulse (!) 45   Temp 97.2 °F (36.2 °C) (Temporal)   Resp 18     PHYSICAL EXAM    General Appearance:   Alert, cooperative, no distress    Head:   Normocephalic, without obvious abnormality, atraumatic    Eyes:   PERRL, conjunctiva/corneas clear    Ears:   Normal external appearance, hearing grossly intact    Nose:  Nares normal, mucosa normal, no drainage    Throat:  Lips, mucosa, and tongue normal    Neck:  Supple, trachea midline    Respiratory:   Equal chest rise, respirations unlabored, no wheezing   Cardiovascular:   Regular rate and rhythm, 1+ edema of the left lower extremity to the knee   Abdomen, GI:   Soft, non-tender, no rebound or guarding    Musculoskeletal:  Atraumatic, no cyanosis   Neurologic:  Nonfocal, strength & sensation grossly intact   Psychiatric: Oriented x3, grossly appropriate judgement and insight   Dermatologic exam: Visual inspection of the periwound reveals the skin to be edematous  Wound exam: see wound description below in procedure note      Assessment:       Valeriy Adan  appears to have a non-healing wound of the posterior left leg. The etiology of the wound is felt to be non-healing surgical. There are multiple complicating factors including edema, venous stasis, decreased mobility, and shear force. A comprehensive wound management program would be helpful to heal this wound. Assessments completed include fall risk and nutritional, functional,and psychological status.   At this time appropriate care would include: periodic debridement and wound care as below. Problem List Items Addressed This Visit          Other    WD - Lower limb ulcer, calf, left, with fat layer exposed ([address] - Primary    Relevant Orders    Initiate Outpatient Wound Care Protocol     Procedure Note    Indications:  Based on my examination of this patient's wound(s) today, sharp excision into necrotic subcutaneous tissue is required to promote healing and evaluate the extent of previous healing. Performed by: ROSA Campbell CNP    Consent obtained: Yes    Time out taken:  Yes    Pain Control: Anesthetic  Anesthetic: 4% Lidocaine Liquid Topical        Debridement:Excisional Debridement    Using curette the wound(s) was/were sharply debrided down through and including the removal of subcutaneous tissue. Devitalized Tissue Debrided:  slough and exudate    Pre Debridement Measurements:  Are located in the Wound Documentation Flow Sheet    All active wounds listed below with today's date are evaluated  Wound(s)    debrided this date include # : 1     Post  Debridement Measurements:  Wound 09/15/22 Wound #1 Left Achilles (Active)   Wound Image   09/23/22 1026   Wound Etiology Non-Healing Surgical 09/19/22 1458   Dressing Status Clean;Dry;New dressing applied 09/23/22 1050   Wound Cleansed Soap and water; Wound cleanser;Cleansed with saline 09/23/22 1026   Wound Length (cm) 1.6 cm 09/23/22 1026   Wound Width (cm) 0.5 cm 09/23/22 1026   Wound Depth (cm) 0.1 cm 09/23/22 1026   Wound Surface Area (cm^2) 0.8 cm^2 09/23/22 1026   Change in Wound Size % (l*w) 85.45 09/23/22 1026   Wound Volume (cm^3) 0.08 cm^3 09/23/22 1026   Wound Healing % 96 09/23/22 1026   Post-Procedure Length (cm) 1.6 cm 09/23/22 1044   Post-Procedure Width (cm) 0.5 cm 09/23/22 1044   Post-Procedure Depth (cm) 0.1 cm 09/23/22 1044   Post-Procedure Surface Area (cm^2) 0.8 cm^2 09/23/22 1044   Post-Procedure Volume (cm^3) 0.08 cm^3 09/23/22 1044   Distance Tunneling (cm) 0 cm Wash hands with soap and water before and after dressing changes. Prior to applying a clean dressing, cleanse wound with normal saline, wound cleanser, or mild soap and water. Ask the physician or nurse before getting the wound(s) wet in a shower     Daily Wound management:              Keep weight off wounds and reposition every 2 hours. Avoid standing for long periods of time. Apply wraps/stockings in AM and remove at bedtime. If swelling is present, elevate legs to the level of the heart or above for 30 minutes 4-5 times a day and/or when sitting. When taking antibiotics take entire prescription as ordered by physician do not stop taking until medicine is all gone. Orders for this week: 9/19/22     Left achilles heel wound cultured 9/15/22             Rx:     Left Achilles wound - Wash with soap and water, pat dry. Apply Gentamicin and Stimulen to the wound bed. Cover with ca alginate and ABD. Wrap with Coban 2 Lite. Leave in place until nurse visit on Monday. Be sure to keep dry (do sponge bath or wear a cast protector sleeve while showering). Follow up with Dr Oscar Connelly in 1 week in the wound care center. Call [phone] for any questions or concerns.   Date__________   Time____________              Treatment Note       Written Patient Dismissal Instructions Given           Electronically signed by ROSA Dougherty CNP on 9/23/2022 at 11:04 AM

## 2022-09-30 ENCOUNTER — HOSPITAL ENCOUNTER (OUTPATIENT)
Dept: WOUND CARE | Age: 47
Discharge: HOME | End: 2022-09-30
Payer: COMMERCIAL

## 2022-09-30 VITALS
SYSTOLIC BLOOD PRESSURE: 139 MMHG | RESPIRATION RATE: 18 BRPM | TEMPERATURE: 97 F | DIASTOLIC BLOOD PRESSURE: 87 MMHG | HEART RATE: 47 BPM

## 2022-09-30 DIAGNOSIS — L97.222 LOWER LIMB ULCER, CALF, LEFT, WITH FAT LAYER EXPOSED (HCC): Primary | ICD-10-CM

## 2022-09-30 PROCEDURE — 11042 DBRDMT SUBQ TIS 1ST 20SQCM/<: CPT | Performed by: NURSE PRACTITIONER

## 2022-09-30 PROCEDURE — 11042 DBRDMT SUBQ TIS 1ST 20SQCM/<: CPT

## 2022-09-30 RX ORDER — BETAMETHASONE DIPROPIONATE 0.05 %
OINTMENT (GRAM) TOPICAL ONCE
OUTPATIENT
Start: 2022-09-30 | End: 2022-09-30

## 2022-09-30 RX ORDER — LIDOCAINE HYDROCHLORIDE 40 MG/ML
SOLUTION TOPICAL ONCE
OUTPATIENT
Start: 2022-09-30 | End: 2022-09-30

## 2022-09-30 RX ORDER — LIDOCAINE 40 MG/G
CREAM TOPICAL ONCE
OUTPATIENT
Start: 2022-09-30 | End: 2022-09-30

## 2022-09-30 RX ORDER — BACITRACIN, NEOMYCIN, POLYMYXIN B 400; 3.5; 5 [USP'U]/G; MG/G; [USP'U]/G
OINTMENT TOPICAL ONCE
OUTPATIENT
Start: 2022-09-30 | End: 2022-09-30

## 2022-09-30 RX ORDER — CLOBETASOL PROPIONATE 0.5 MG/G
OINTMENT TOPICAL ONCE
OUTPATIENT
Start: 2022-09-30 | End: 2022-09-30

## 2022-09-30 RX ORDER — GINSENG 100 MG
CAPSULE ORAL ONCE
OUTPATIENT
Start: 2022-09-30 | End: 2022-09-30

## 2022-09-30 RX ORDER — LIDOCAINE HYDROCHLORIDE 20 MG/ML
JELLY TOPICAL ONCE
OUTPATIENT
Start: 2022-09-30 | End: 2022-09-30

## 2022-09-30 RX ORDER — LIDOCAINE 50 MG/G
OINTMENT TOPICAL ONCE
OUTPATIENT
Start: 2022-09-30 | End: 2022-09-30

## 2022-09-30 RX ORDER — GENTAMICIN SULFATE 1 MG/G
OINTMENT TOPICAL ONCE
OUTPATIENT
Start: 2022-09-30 | End: 2022-09-30

## 2022-09-30 RX ORDER — BACITRACIN ZINC AND POLYMYXIN B SULFATE 500; 1000 [USP'U]/G; [USP'U]/G
OINTMENT TOPICAL ONCE
OUTPATIENT
Start: 2022-09-30 | End: 2022-09-30

## 2022-09-30 NOTE — WOUND CARE
Multilayer Compression Wrap   (Not Unna) Below the Knee    NAME:  Cami Navarro  YOB: 1975  MEDICAL RECORD NUMBER:  [id number]  DATE:  9/30/2022    Multilayer compression wrap: Removed old Multilayer wrap if indicated and wash leg with mild soap/water. Applied moisturizing agent to dry skin as needed. Applied primary and secondary dressing as ordered. Applied multilayered dressing below the knee to left lower leg. Instructed patient/caregiver not to remove dressing and to keep it clean and dry. Instructed patient/caregiver on complications to report to provider, such as pain, numbness in toes, heavy drainage, and slippage of dressing. Instructed patient on purpose of compression dressing and on activity and exercise recommendations.       Electronically signed by Amberly Hurtado LPN on 7/27/1187 at 50:70 AM

## 2022-09-30 NOTE — DISCHARGE INSTRUCTIONS
PHYSICIAN ORDERS AND DISCHARGE INSTRUCTIONS  NOTE: Upon discharge from the [address], you will receive a patient experience survey via E-mail. We would be grateful if you would take the time to fill this survey out. Wound care order history:              ANTOINE's   Right       Left    Date               Vascular studies/Intervention: . Cultures: 9/15/22. Antibiotics: . HbA1c:  . Compression/Lymph Pumps: [facility] Grafts: Blease Pickerel: . Continuing wound care orders and information:              Residence: . Continue home health care with: [facility] Your wound-care supplies will be provided by: [facility] Pharmacy: . Wound cleansing:                           Do not scrub or use excessive force. Wash hands with soap and water before and after dressing changes. Prior to applying a clean dressing, cleanse wound with normal saline,                          wound cleanser, or mild soap and water. Ask your physician or nurse before getting the wound(s) wet in the shower. Daily Wound management:                          Keep weight off wounds and reposition every 2 hours. Avoid standing for long periods of time. Evaluate legs to the level of the heart or above for 30 minutes 4-5 times a day and/or when sitting. When taking antibiotics take entire prescription as ordered by MD do not stop taking until medicine is all gone. Orders for this week (9/30/22): Left Ankle- Wash with mild soap and water, rinse with saline, pat dry with 4x4  Apply Gentamicin and Stimulen to the wound bed. Cover with versatel, saline dampened hydrofera blue (Use full sheet and wrap around ), calcium alginate and ABD. Wrap with Coban 2 Lite. Change on Friday                 Follow up with [facility], CNP  In 1 weeks in the wound care center  Call [phone] for any questions or concerns.   Date__________   Time____________

## 2022-09-30 NOTE — PROGRESS NOTES
Clyde Toro  AGE: 50 y.o. GENDER: male  : 1975  EPISODE DATE:  2022   Referred by: Jennifer Flor DO     Subjective:     CHIEF COMPLAINT: nonhealing ulcer post achilles tendon surgery     HISTORY of PRESENT ILLNESS      Sue De León is a 50 y.o. male who presents to the [address] [place] [address] for evaluation and treatment of Chronic non-healing surgical  ulcer(s) of left posterior lower leg. The condition is of moderate severity. The ulcer has been present for approximately 3 months - had surgery to repair the left achilles tendon 22. He developed an open area and concern for infection. Cultures grew MSSA. The underlying cause is thought to be nonhealing surgical. The patients care to date has included antibiotics (Keflex) which he completed recently, elevation, and dressings. More recently he has been leaving it open to air. The patient has significant underlying medical conditions as below. Wound Pain Timing/Severity: waxing and waning, moderate  Quality of pain: dull, aching, throbbing  Severity of pain:  3 / 10   Modifying Factors: edema, venous stasis, decreased mobility, and shear force  Associated Signs/Symptoms: edema, erythema, drainage, and pain    ANTOINE: 9/15/22    Wound infection: wound culture will be obtained as needed for symptoms of infection 9/15/22      Arterial evaluation: if indicated based on wound, location, symptoms and healing    Venous Evaluation: if indicated based on wound, location, symptoms and healing    Diabetes: No  Smoking: Never, does chew tobacco  Anticoagulant/Antiplatelet therapy:Low dose aspirin  Immunosuppression:No  Obesity: Yes  Other History: HTN, acid reflux    Patient educated on the 6 essential components necessary for wound healing: Circulation, Debridements, Proper Dressings and Topical Wound Products, Infection Control, Edema Control and Offloading.      Patient educated on those factors that negatively effect or impact wound healing: smoking, obesity, uncontrolled diabetes, anticoagulant and immunosuppressive regimens, inadequate nutrition, untreated arterial and venous disease if applicable and measures to manage edema. PAST MEDICAL HISTORY        Diagnosis Date    Acid reflux     Hypertension     Sleep apnea        PAST SURGICAL HISTORY    Past Surgical History:   Procedure Laterality Date    ACHILLES TENDON SURGERY Left 06/30/2022    Left achilles tendon lengthening repair-By Dr. Mustapha Booker in [address] Left 6/30/2022    LEFT ACHILLES TENDON LENGTHENING REPAIR performed by Dottie Prado DO at 2021 [facility]    Family History   Problem Relation Age of Onset    Cancer Father     High Blood Pressure Father        SOCIAL HISTORY    Social History     Tobacco Use    Smoking status: Never    Smokeless tobacco: Current     Types: Chew   Vaping Use    Vaping Use: Never used   Substance Use Topics    Alcohol use: Yes     Comment: 4 times/week     Drug use: Never       ALLERGIES    No Known Allergies    MEDICATIONS    Current Outpatient Medications on File Prior to Encounter   Medication Sig Dispense Refill    hydrOXYzine HCl (ATARAX) 50 MG tablet Take 1 tablet by mouth every 12 hours as needed for Itching 30 tablet 0    sildenafil (VIAGRA) 100 MG tablet Take 100 mg by mouth as needed      aspirin 81 MG EC tablet Take 81 mg by mouth daily      omeprazole (PRILOSEC) 40 MG delayed release capsule Take 40 mg by mouth daily       amLODIPine (NORVASC) 5 MG tablet Take 5 mg by mouth daily       bisoprolol-hydroCHLOROthiazide (ZIAC) 5-6.25 MG per tablet TAKE 1 TABLET DAILY FOR BLOOD PRESSURE      amphetamine-dextroamphetamine (ADDERALL XR) 10 MG extended release capsule TAKE 1 CAPSULE (10 MG TOTAL) BY MOUTH 2 TIMES A DAY. zolpidem (AMBIEN) 10 MG tablet Take 10 mg by mouth nightly as needed.       fexofenadine (ALLEGRA) 180 MG tablet Take 180 mg by mouth daily as needed No current facility-administered medications on file prior to encounter. PROBLEM LIST    Patient Active Problem List   Diagnosis    WD - Lower limb ulcer, calf, left, with fat layer exposed ([address]       REVIEW OF SYSTEMS    A comprehensive review of systems was negative except for: Cardiovascular: positive for lower extremity edema  Integument/breast: positive for skin lesion(s)  Musculoskeletal: positive for myalgias      Objective:      /87   Pulse (!) 47   Temp 97 °F (36.1 °C) (Temporal)   Resp 18     PHYSICAL EXAM    General Appearance:   Alert, cooperative, no distress    Head:   Normocephalic, without obvious abnormality, atraumatic    Eyes:   PERRL, conjunctiva/corneas clear    Ears:   Normal external appearance, hearing grossly intact    Nose:  Nares normal, mucosa normal, no drainage    Throat:  Lips, mucosa, and tongue normal    Neck:  Supple, trachea midline    Respiratory:   Equal chest rise, respirations unlabored, no wheezing   Cardiovascular:   Regular rate and rhythm, 1+ edema of the left lower extremity to the knee   Abdomen, GI:   Soft, non-tender, no rebound or guarding    Musculoskeletal:  Atraumatic, no cyanosis   Neurologic:  Nonfocal, strength & sensation grossly intact   Psychiatric: Oriented x3, grossly appropriate judgement and insight   Dermatologic exam: Visual inspection of the periwound reveals the skin to be edematous  Wound exam: see wound description below in procedure note      Assessment:       Sudarshan Reese  appears to have a non-healing wound of the posterior left leg. The etiology of the wound is felt to be non-healing surgical. There are multiple complicating factors including edema, venous stasis, decreased mobility, and shear force. A comprehensive wound management program would be helpful to heal this wound. Assessments completed include fall risk and nutritional, functional,and psychological status.   At this time appropriate care would include: periodic Position ___ O'Clock 0 09/30/22 1031   Undermining Starts ___ O'Clock 0 09/30/22 1031   Undermining Ends___ O'Clock 0 09/30/22 1031   Undermining Maxium Distance (cm) 0 09/30/22 1031   Wound Assessment Pink/red 09/30/22 1031   Drainage Amount Small 09/30/22 1031   Drainage Description Serosanguinous 09/30/22 1031   Odor None 09/30/22 1031   Saima-wound Assessment Fragile; Intact 09/30/22 1031   Margins Defined edges 09/30/22 1031   Wound Thickness Description not for Pressure Injury Full thickness 09/30/22 1031   Number of days: 14     Percent of Wound(s) Debrided: approximately 100%    Total  Area  Debrided:  0.52 sq cm     Bleeding:  Minimal    Hemostasis Achieved:  by pressure    Procedural Pain:  0  / 10     Post Procedural Pain:  0 / 10     Response to treatment:  Well tolerated by patient. Wound status: Improved, regimen as below. He has no S&S local or systemic infection. Plan:     PHYSICIAN ORDERS AND DISCHARGE INSTRUCTIONS     NOTE: Upon discharge from the [address], you will receive a patient experience survey. We would be grateful if you would take the time to fill this survey out. Wound care order history:                 ANTOINE's   Right       Left               Date:              Cultures:  Left achilles heel wound cultured 9/15/22              Grafts:                Antibiotics:           Continuing wound care orders and information:                 Residence:                Continue home health care with:               Your wound-care supplies will be provided by: Wound cleansing:               Do not scrub or use excessive force. Wash hands with soap and water before and after dressing changes. Prior to applying a clean dressing, cleanse wound with normal saline, wound cleanser, or mild soap and water.                Ask the physician or nurse before getting the wound(s) wet in a shower     Daily Wound management:              Keep weight off wounds and reposition every 2 hours. Avoid standing for long periods of time. Apply wraps/stockings in AM and remove at bedtime. If swelling is present, elevate legs to the level of the heart or above for 30 minutes 4-5 times a day and/or when sitting. When taking antibiotics take entire prescription as ordered by physician do not stop taking until medicine is all gone. Orders for this week: 9/19/22     Left achilles heel wound cultured 9/15/22             Rx:     Left Achilles wound - Wash with soap and water, pat dry. Apply Gentamicin and Stimulen to the wound bed. Cover with ca alginate and ABD. Wrap with Coban 2 Lite. Leave in place until nurse visit on Monday. Be sure to keep dry (do sponge bath or wear a cast protector sleeve while showering). Follow up with Dr Toshia Lindsay in 1 week in the wound care center. Call [phone] for any questions or concerns.   Date__________   Time____________              Treatment Note       Written Patient Dismissal Instructions Given           Electronically signed by ROSA Covarrubias CNP on 9/30/2022 at 12:38 PM

## 2022-10-07 ENCOUNTER — HOSPITAL ENCOUNTER (OUTPATIENT)
Dept: WOUND CARE | Age: 47
Discharge: HOME | End: 2022-10-07
Payer: COMMERCIAL

## 2022-10-07 VITALS
SYSTOLIC BLOOD PRESSURE: 128 MMHG | RESPIRATION RATE: 16 BRPM | HEART RATE: 52 BPM | DIASTOLIC BLOOD PRESSURE: 83 MMHG | TEMPERATURE: 97 F

## 2022-10-07 DIAGNOSIS — L97.222 LOWER LIMB ULCER, CALF, LEFT, WITH FAT LAYER EXPOSED (HCC): Primary | ICD-10-CM

## 2022-10-07 PROCEDURE — 11044 DBRDMT BONE 1ST 20 SQ CM/<: CPT

## 2022-10-07 PROCEDURE — 97597 DBRDMT OPN WND 1ST 20 CM/<: CPT

## 2022-10-07 PROCEDURE — 97597 DBRDMT OPN WND 1ST 20 CM/<: CPT | Performed by: NURSE PRACTITIONER

## 2022-10-07 NOTE — PROGRESS NOTES
Clyde Toro  AGE: 50 y.o. GENDER: male  : 1975  EPISODE DATE:  10/7/2022   Referred by: Mary Ann Plummer DO     Subjective:     CHIEF COMPLAINT: nonhealing ulcer post achilles tendon surgery     HISTORY of PRESENT ILLNESS      Sudarshan Reese is a 50 y.o. male who presents to the [address] [place] [address] for evaluation and treatment of Chronic non-healing surgical  ulcer(s) of left posterior lower leg. The wound is of mild severity. The ulcer has been present for approximately 3 months - had surgery to repair the left achilles tendon 22. He developed an open area and concern for infection. Cultures grew MSSA. The underlying cause is thought to be nonhealing surgical. The patients care to date has included antibiotics (Keflex) which he completed recently, elevation, and dressings. More recently he has been leaving it open to air. The patient has significant underlying medical conditions as below. Wound Pain Timing/Severity: waxing and waning, moderate  Quality of pain: dull, aching, throbbing  Severity of pain:  1 / 10   Modifying Factors: edema, venous stasis, decreased mobility, and shear force  Associated Signs/Symptoms: edema, erythema, drainage, and pain    ANTOINE: 9/15/22    Wound infection: wound culture will be obtained as needed for symptoms of infection 9/15/22      Arterial evaluation: if indicated based on wound, location, symptoms and healing    Venous Evaluation: if indicated based on wound, location, symptoms and healing    Diabetes: No  Smoking: Never, does chew tobacco  Anticoagulant/Antiplatelet therapy:Low dose aspirin  Immunosuppression:No  Obesity: Yes  Other History: HTN, acid reflux    Patient educated on the 6 essential components necessary for wound healing: Circulation, Debridements, Proper Dressings and Topical Wound Products, Infection Control, Edema Control and Offloading.      Patient educated on those factors that negatively effect or impact wound healing: smoking, obesity, uncontrolled diabetes, anticoagulant and immunosuppressive regimens, inadequate nutrition, untreated arterial and venous disease if applicable and measures to manage edema. PAST MEDICAL HISTORY        Diagnosis Date    Acid reflux     Hypertension     Sleep apnea        PAST SURGICAL HISTORY    Past Surgical History:   Procedure Laterality Date    ACHILLES TENDON SURGERY Left 06/30/2022    Left achilles tendon lengthening repair-By Dr. Tyler Brody in [address] Left 6/30/2022    LEFT ACHILLES TENDON LENGTHENING REPAIR performed by Randa Foote DO at 2021 [facility]    Family History   Problem Relation Age of Onset    Cancer Father     High Blood Pressure Father        SOCIAL HISTORY    Social History     Tobacco Use    Smoking status: Never    Smokeless tobacco: Current     Types: Chew   Vaping Use    Vaping Use: Never used   Substance Use Topics    Alcohol use: Yes     Comment: 4 times/week     Drug use: Never       ALLERGIES    No Known Allergies    MEDICATIONS    Current Outpatient Medications on File Prior to Encounter   Medication Sig Dispense Refill    sildenafil (VIAGRA) 100 MG tablet Take 100 mg by mouth as needed      aspirin 81 MG EC tablet Take 81 mg by mouth daily      omeprazole (PRILOSEC) 40 MG delayed release capsule Take 40 mg by mouth daily       amLODIPine (NORVASC) 5 MG tablet Take 5 mg by mouth daily       bisoprolol-hydroCHLOROthiazide (ZIAC) 5-6.25 MG per tablet TAKE 1 TABLET DAILY FOR BLOOD PRESSURE      amphetamine-dextroamphetamine (ADDERALL XR) 10 MG extended release capsule TAKE 1 CAPSULE (10 MG TOTAL) BY MOUTH 2 TIMES A DAY. zolpidem (AMBIEN) 10 MG tablet Take 10 mg by mouth nightly as needed. fexofenadine (ALLEGRA) 180 MG tablet Take 180 mg by mouth daily as needed        No current facility-administered medications on file prior to encounter.        PROBLEM LIST    Patient Active Problem List   Diagnosis    WD - Lower limb ulcer, calf, left, with fat layer exposed ([address]       REVIEW OF SYSTEMS    A comprehensive review of systems was negative except for: Cardiovascular: positive for lower extremity edema  Integument/breast: positive for skin lesion(s)  Musculoskeletal: positive for myalgias      Objective:      /83   Pulse 52   Temp 97 °F (36.1 °C) (Temporal)   Resp 16     PHYSICAL EXAM    General Appearance:   Alert, cooperative, no distress    Head:   Normocephalic, without obvious abnormality, atraumatic    Eyes:   PERRL, conjunctiva/corneas clear    Ears:   Normal external appearance, hearing grossly intact    Nose:  Nares normal, mucosa normal, no drainage    Throat:  Lips, mucosa, and tongue normal    Neck:  Supple, trachea midline    Respiratory:   Equal chest rise, respirations unlabored, no wheezing   Cardiovascular:   Regular rate and rhythm, 1+ edema of the left lower extremity to the knee   Abdomen, GI:   Soft, non-tender, no rebound or guarding    Musculoskeletal:  Atraumatic, no cyanosis   Neurologic:  Nonfocal, strength & sensation grossly intact   Psychiatric: Oriented x3, grossly appropriate judgement and insight   Dermatologic exam: Visual inspection of the periwound reveals the skin to be edematous  Wound exam: see wound description below in procedure note      Assessment:       Chuy Long  appears to have a non-healing wound of the posterior left leg. The etiology of the wound is felt to be non-healing surgical. There are multiple complicating factors including edema, venous stasis, decreased mobility, and shear force. A comprehensive wound management program would be helpful to heal this wound. Assessments completed include fall risk and nutritional, functional,and psychological status. At this time appropriate care would include: periodic debridement and wound care as below.      Problem List Items Addressed This Visit          Other    WD - Lower limb ulcer, calf, left, with fat layer exposed ([address] - Primary     Procedure Note    Indications:  Based on my examination of this patient's wound(s) today, sharp excision into necrotic devitalized tissue is required to promote healing and evaluate the extent of previous healing. Performed by: ROSA Pineda - CNP    Consent obtained: Yes    Time out taken:  Yes    Pain Control: Anesthetic  Anesthetic: 4% Lidocaine Liquid Topical        Debridement:Excisional Debridement    Using curette the wound(s) was/were sharply debrided down through and including the removal of devitalized tissue        Devitalized Tissue Debrided:  slough and exudate    Pre Debridement Measurements:  Are located in the Wound Documentation Flow Sheet    All active wounds listed below with today's date are evaluated  Wound(s)    debrided this date include # : 1     Post  Debridement Measurements:  Wound 09/15/22 #1 Left Achilles (Active)   Wound Image   09/30/22 1031   Wound Etiology Non-Healing Surgical 09/19/22 1458   Dressing Status Clean;Dry;New dressing applied 10/07/22 1006   Wound Cleansed Wound cleanser; Soap and water 10/07/22 0912   Wound Length (cm) 0.5 cm 10/07/22 0912   Wound Width (cm) 0.4 cm 10/07/22 0912   Wound Depth (cm) 0.1 cm 10/07/22 0912   Wound Surface Area (cm^2) 0.2 cm^2 10/07/22 0912   Change in Wound Size % (l*w) 96.36 10/07/22 0912   Wound Volume (cm^3) 0.02 cm^3 10/07/22 0912   Wound Healing % 99 10/07/22 0912   Post-Procedure Length (cm) 0.5 cm 10/07/22 0929   Post-Procedure Width (cm) 0.4 cm 10/07/22 0929   Post-Procedure Depth (cm) 0.1 cm 10/07/22 0929   Post-Procedure Surface Area (cm^2) 0.2 cm^2 10/07/22 0929   Post-Procedure Volume (cm^3) 0.02 cm^3 10/07/22 0929   Distance Tunneling (cm) 0 cm 10/07/22 0912   Tunneling Position ___ O'Clock 0 10/07/22 0912   Undermining Starts ___ O'Clock 0 10/07/22 0912   Undermining Ends___ O'Clock 0 10/07/22 0912   Undermining Maxium Distance (cm) 0 10/07/22 0912   Wound Assessment Pink/red 10/07/22 0912   Drainage Amount None 10/07/22 0912   Drainage Description Serosanguinous 09/30/22 1031   Odor None 10/07/22 0912   Saima-wound Assessment Fragile; Intact 09/30/22 1031   Margins Defined edges 09/30/22 1031   Wound Thickness Description not for Pressure Injury Full thickness 09/30/22 1031   Number of days: 21     Percent of Wound(s) Debrided: approximately 100%    Total  Area  Debrided:  0.2 sq cm     Bleeding:  Minimal    Hemostasis Achieved:  by pressure    Procedural Pain:  0  / 10     Post Procedural Pain:  0 / 10     Response to treatment:  Well tolerated by patient. Wound status: Almost healed, regimen as below. He has no S&S local or systemic infection. Plan:     PHYSICIAN ORDERS AND DISCHARGE INSTRUCTIONS     NOTE: Upon discharge from the [address], you will receive a patient experience survey. We would be grateful if you would take the time to fill this survey out. Wound care order history:                 ANTOINE's   Right       Left               Date:              Cultures:  Left achilles heel wound cultured 9/15/22              Grafts:                Antibiotics:           Continuing wound care orders and information:                 Residence:                Continue home health care with:               Your wound-care supplies will be provided by: Wound cleansing:               Do not scrub or use excessive force. Wash hands with soap and water before and after dressing changes. Prior to applying a clean dressing, cleanse wound with normal saline, wound cleanser, or mild soap and water. Ask the physician or nurse before getting the wound(s) wet in a shower     Daily Wound management:              Keep weight off wounds and reposition every 2 hours. Avoid standing for long periods of time. Apply wraps/stockings in AM and remove at bedtime.               If swelling is present, elevate legs to the level of the heart or above for 30 minutes 4-5 times a day and/or when sitting. When taking antibiotics take entire prescription as ordered by physician do not stop taking until medicine is all gone. Orders for this week: 9/19/22     Left achilles heel wound cultured 9/15/22             Rx:     Left Achilles wound - Wash with soap and water, pat dry. Apply Gentamicin and Stimulen to the wound bed. Cover with ca alginate and ABD. Wrap with Coban 2 Lite. Leave in place until nurse visit on Monday. Be sure to keep dry (do sponge bath or wear a cast protector sleeve while showering). Follow up with Dr Kadie Bundy in 1 week in the wound care center. Call [phone] for any questions or concerns.   Date__________   Time____________              Treatment Note       Written Patient Dismissal Instructions Given           Electronically signed by ROSA Pineda CNP on 10/7/2022 at 10:20 AM

## 2022-10-07 NOTE — DISCHARGE INSTRUCTIONS
PHYSICIAN ORDERS AND DISCHARGE INSTRUCTIONS  NOTE: Upon discharge from the [address], you will receive a patient experience survey via E-mail. We would be grateful if you would take the time to fill this survey out. Wound care order history:              ANTOINE's   Right       Left    Date               Vascular studies/Intervention: . Cultures: 9/15/22. Antibiotics: . HbA1c:  . Compression/Lymph Pumps: Isaac Naqvi Grafts: Karen Shepard: . Continuing wound care orders and information:              Residence: . Continue home health care with: Isaac [facility] Your wound-care supplies will be provided by: [facility] Pharmacy: . Wound cleansing:                           Do not scrub or use excessive force. Wash hands with soap and water before and after dressing changes. Prior to applying a clean dressing, cleanse wound with normal saline,                          wound cleanser, or mild soap and water. Ask your physician or nurse before getting the wound(s) wet in the shower. Daily Wound management:                          Keep weight off wounds and reposition every 2 hours. Avoid standing for long periods of time. Evaluate legs to the level of the heart or above for 30 minutes 4-5 times a day and/or when sitting. When taking antibiotics take entire prescription as ordered by MD do not stop taking until medicine is all gone. Orders for this week (10/7/22): Left Ankle- Wash with mild soap and water, rinse with saline, pat dry with 4x4  Apply A&D ointment to entier leg and foot (including wound)  Jennifer to wound   Secure with gentac border  Change daily.                  Follow up with Mare Thomas CNP In 1 weeks in the wound care center  Call [phone] for any questions or concerns.   Date__________   Time____________

## 2022-10-13 NOTE — DISCHARGE INSTRUCTIONS
PHYSICIAN ORDERS AND DISCHARGE INSTRUCTIONS  NOTE: Upon discharge from the [address], you will receive a patient experience survey via E-mail. We would be grateful if you would take the time to fill this survey out. Wound care order history:              ANTOINE's   Right       Left    Date               Vascular studies/Intervention: . Cultures: 9/15/22. Antibiotics: . HbA1c:  . Compression/Lymph Pumps: Parmjit Reich Grafts: Rehan Emery: . Continuing wound care orders and information:              Residence: . Continue home health care with: Parmjit Reich Your wound-care supplies will be provided by: Parmjit Reich Pharmacy: . Wound cleansing:                           Do not scrub or use excessive force. Wash hands with soap and water before and after dressing changes. Prior to applying a clean dressing, cleanse wound with normal saline,                          wound cleanser, or mild soap and water. Ask your physician or nurse before getting the wound(s) wet in the shower. Daily Wound management:                          Keep weight off wounds and reposition every 2 hours. Avoid standing for long periods of time. Evaluate legs to the level of the heart or above for 30 minutes 4-5 times a day and/or when sitting. When taking antibiotics take entire prescription as ordered by MD do not stop taking until medicine is all gone. Orders for this week (10/7/22): Left Ankle- Wash with mild soap and water, rinse with saline, pat dry with 4x4  Apply A&D ointment to entier leg and foot (including wound)  Jennifer to wound   Secure with gentac border  Change daily.                  Follow up with Kecia Atkins CNP In 1 weeks in the wound care center  Call [phone] for any questions or concerns.   Date__________   Time____________

## 2022-10-14 ENCOUNTER — TELEPHONE (OUTPATIENT)
Dept: WOUND CARE | Age: 47
End: 2022-10-14

## 2022-10-14 ENCOUNTER — HOSPITAL ENCOUNTER (OUTPATIENT)
Dept: WOUND CARE | Age: 47
Discharge: HOME | End: 2022-10-14

## 2022-10-14 DIAGNOSIS — L97.222 LOWER LIMB ULCER, CALF, LEFT, WITH FAT LAYER EXPOSED (HCC): Primary | ICD-10-CM

## 2022-10-17 NOTE — TELEPHONE ENCOUNTER
Patient called and reported they were healed. No need for further followup.     Electronically signed by Jamal Councilman, RN on 10/17/2022 at 11:10 AM

## 2022-10-20 RX ORDER — HYDROXYZINE 50 MG/1
50 TABLET, FILM COATED ORAL EVERY 12 HOURS PRN
Qty: 30 TABLET | Refills: 0 | OUTPATIENT
Start: 2022-10-20 | End: 2022-11-04

## (undated) DEVICE — DRESSING PETRO W3XL3IN OIL EMUL N ADH GZ KNIT IMPREG CELOS

## (undated) DEVICE — TOWEL,OR,DSP,ST,BLUE,STD,6/PK,12PK/CS: Brand: MEDLINE

## (undated) DEVICE — SPONGE LAP W18XL18IN WHT COT 4 PLY FLD STRUNG RADPQ DISP ST

## (undated) DEVICE — BANDAGE COMPR W6INXL5YD WHT BGE POLY COT M E WRP WV HK AND

## (undated) DEVICE — ELECTRODE ES AD CRDLSS PT RET REM POLYHESIVE

## (undated) DEVICE — COUNTER NDL 30 COUNT FOAM STRP SGL MAG

## (undated) DEVICE — DRAPE SHEET ULTRAGARD: Brand: MEDLINE

## (undated) DEVICE — PENCIL ES CRD L10FT HND SWCHING ROCK SWCH W/ EDGE COAT BLDE

## (undated) DEVICE — CHLORAPREP 26ML ORANGE

## (undated) DEVICE — DRAPE,U/ SHT,SPLIT,PLAS,STERIL: Brand: MEDLINE

## (undated) DEVICE — YANKAUER,FLEXIBLE HANDLE,REGLR CAPACITY: Brand: MEDLINE INDUSTRIES, INC.

## (undated) DEVICE — PACK,BASIC,IX: Brand: MEDLINE

## (undated) DEVICE — SOLUTION IV 1000ML 0.9% SOD CHL FOR IRRIG PLAS CONT

## (undated) DEVICE — MARKER SURG SKIN UTIL REGULAR/FINE 2 TIP W/ RUL AND 9 LBL

## (undated) DEVICE — INTENDED FOR TISSUE SEPARATION, AND OTHER PROCEDURES THAT REQUIRE A SHARP SURGICAL BLADE TO PUNCTURE OR CUT.: Brand: BARD-PARKER ® STAINLESS STEEL BLADES

## (undated) DEVICE — CRUTCH WLK TALL AD 300LB STD AX ALUM PUSH BTTN GRDIAN

## (undated) DEVICE — GLOVE ORANGE PI 7 1/2   MSG9075

## (undated) DEVICE — BANDAGE,ELASTIC,ESMARK,STERILE,6"X9',LF: Brand: MEDLINE

## (undated) DEVICE — SYRINGE IRRIG 60ML SFT PLIABLE BLB EZ TO GRP 1 HND USE W/

## (undated) DEVICE — DRAPE,EXTREMITY,89X128,STERILE: Brand: MEDLINE

## (undated) DEVICE — SUTURE ETHLN SZ 3-0 L30IN NONABSORBABLE BLK FS-1 L24MM 3/8 669H

## (undated) DEVICE — GLOVE SURG SZ 8 L12IN THK75MIL DK GRN LTX FREE

## (undated) DEVICE — SOLUTION IV IRRIG WATER 1000ML POUR BRL 2F7114

## (undated) DEVICE — SUTURE VCRL SZ 2-0 L18IN ABSRB UD CT-1 L36MM 1/2 CIR J839D

## (undated) DEVICE — SPONGE GZ W4XL8IN COT WVN 12 PLY

## (undated) DEVICE — 3M™ STERI-DRAPE™ U-DRAPE 1015: Brand: STERI-DRAPE™

## (undated) DEVICE — TUBING, SUCTION, 9/32" X 10', STRAIGHT: Brand: MEDLINE

## (undated) DEVICE — LINER,SEMI-RIGID,3000CC,50EA/CS: Brand: MEDLINE

## (undated) DEVICE — SUTURE FIBERWIRE 2 L97CM NONABSORBABLE BLU L36.6MM 1/2 CIR AR7206